# Patient Record
Sex: FEMALE | Race: WHITE | Employment: OTHER | URBAN - METROPOLITAN AREA
[De-identification: names, ages, dates, MRNs, and addresses within clinical notes are randomized per-mention and may not be internally consistent; named-entity substitution may affect disease eponyms.]

---

## 2017-05-01 ENCOUNTER — LAB ENCOUNTER (OUTPATIENT)
Dept: LAB | Facility: HOSPITAL | Age: 82
End: 2017-05-01
Attending: INTERNAL MEDICINE
Payer: MEDICARE

## 2017-05-01 DIAGNOSIS — E11.49 DIABETIC NEUROPATHY WITH NEUROLOGIC COMPLICATION (HCC): ICD-10-CM

## 2017-05-01 DIAGNOSIS — M19.90 SENILE ARTHRITIS: ICD-10-CM

## 2017-05-01 DIAGNOSIS — E66.9 OBESITY, UNSPECIFIED: ICD-10-CM

## 2017-05-01 DIAGNOSIS — E13.39: ICD-10-CM

## 2017-05-01 DIAGNOSIS — E11.40 DIABETIC NEUROPATHY WITH NEUROLOGIC COMPLICATION (HCC): ICD-10-CM

## 2017-05-01 DIAGNOSIS — E11.65 TYPE II DIABETES MELLITUS, UNCONTROLLED (HCC): Primary | ICD-10-CM

## 2017-05-01 DIAGNOSIS — I10 ESSENTIAL HYPERTENSION, MALIGNANT: ICD-10-CM

## 2017-05-01 DIAGNOSIS — E13.65: ICD-10-CM

## 2017-05-01 DIAGNOSIS — E78.2 MIXED HYPERLIPIDEMIA: ICD-10-CM

## 2017-05-01 PROCEDURE — 80053 COMPREHEN METABOLIC PANEL: CPT

## 2017-05-01 PROCEDURE — 83036 HEMOGLOBIN GLYCOSYLATED A1C: CPT

## 2017-05-01 PROCEDURE — 36415 COLL VENOUS BLD VENIPUNCTURE: CPT

## 2017-05-01 PROCEDURE — 85025 COMPLETE CBC W/AUTO DIFF WBC: CPT

## 2017-06-05 ENCOUNTER — PRIOR ORIGINAL RECORDS (OUTPATIENT)
Dept: OTHER | Age: 82
End: 2017-06-05

## 2017-07-17 ENCOUNTER — PRIOR ORIGINAL RECORDS (OUTPATIENT)
Dept: OTHER | Age: 82
End: 2017-07-17

## 2017-07-18 ENCOUNTER — HOSPITAL ENCOUNTER (OUTPATIENT)
Dept: CV DIAGNOSTICS | Facility: HOSPITAL | Age: 82
Discharge: HOME | End: 2017-07-18
Attending: INTERNAL MEDICINE
Payer: MEDICARE

## 2017-07-18 DIAGNOSIS — R06.03 ACUTE RESPIRATORY DISTRESS: ICD-10-CM

## 2017-07-18 DIAGNOSIS — I25.10 DISEASE OF CARDIOVASCULAR SYSTEM: ICD-10-CM

## 2017-07-18 DIAGNOSIS — I48.0 EPISODIC ATRIAL FIBRILLATION (HCC): ICD-10-CM

## 2017-07-18 PROCEDURE — 93017 CV STRESS TEST TRACING ONLY: CPT | Performed by: INTERNAL MEDICINE

## 2017-07-18 PROCEDURE — 78452 HT MUSCLE IMAGE SPECT MULT: CPT | Performed by: INTERNAL MEDICINE

## 2017-07-18 PROCEDURE — 93018 CV STRESS TEST I&R ONLY: CPT | Performed by: INTERNAL MEDICINE

## 2017-07-20 ENCOUNTER — PRIOR ORIGINAL RECORDS (OUTPATIENT)
Dept: OTHER | Age: 82
End: 2017-07-20

## 2017-08-02 ENCOUNTER — PRIOR ORIGINAL RECORDS (OUTPATIENT)
Dept: OTHER | Age: 82
End: 2017-08-02

## 2017-08-02 ENCOUNTER — LAB ENCOUNTER (OUTPATIENT)
Dept: LAB | Facility: HOSPITAL | Age: 82
End: 2017-08-02
Attending: INTERNAL MEDICINE
Payer: MEDICARE

## 2017-08-02 DIAGNOSIS — E11.21 DIABETIC GLOMERULOPATHY (HCC): ICD-10-CM

## 2017-08-02 DIAGNOSIS — E13.39: Primary | ICD-10-CM

## 2017-08-02 DIAGNOSIS — I10 ESSENTIAL HYPERTENSION, MALIGNANT: ICD-10-CM

## 2017-08-02 DIAGNOSIS — M19.90 SENILE ARTHRITIS: ICD-10-CM

## 2017-08-02 DIAGNOSIS — E11.49 TYPE II OR UNSPECIFIED TYPE DIABETES MELLITUS WITH NEUROLOGICAL MANIFESTATIONS, NOT STATED AS UNCONTROLLED(250.60): ICD-10-CM

## 2017-08-02 DIAGNOSIS — E78.2 MIXED HYPERLIPIDEMIA: ICD-10-CM

## 2017-08-02 DIAGNOSIS — E66.01 MORBID OBESITY (HCC): ICD-10-CM

## 2017-08-02 LAB
ALBUMIN SERPL BCP-MCNC: 3.8 G/DL (ref 3.5–4.8)
ALBUMIN/GLOB SERPL: 1.3 {RATIO} (ref 1–2)
ALP SERPL-CCNC: 74 U/L (ref 32–100)
ALT SERPL-CCNC: 14 U/L (ref 14–54)
ANION GAP SERPL CALC-SCNC: 9 MMOL/L (ref 0–18)
AST SERPL-CCNC: 19 U/L (ref 15–41)
BILIRUB SERPL-MCNC: 0.7 MG/DL (ref 0.3–1.2)
BUN SERPL-MCNC: 35 MG/DL (ref 8–20)
BUN/CREAT SERPL: 28.9 (ref 10–20)
CALCIUM SERPL-MCNC: 9 MG/DL (ref 8.5–10.5)
CHLORIDE SERPL-SCNC: 98 MMOL/L (ref 95–110)
CHOLEST SERPL-MCNC: 159 MG/DL (ref 110–200)
CO2 SERPL-SCNC: 30 MMOL/L (ref 22–32)
CREAT SERPL-MCNC: 1.21 MG/DL (ref 0.5–1.5)
GLOBULIN PLAS-MCNC: 2.9 G/DL (ref 2.5–3.7)
GLUCOSE SERPL-MCNC: 172 MG/DL (ref 70–99)
HBA1C MFR BLD: 7.4 % (ref 4–6)
HDLC SERPL-MCNC: 53 MG/DL
LDLC SERPL CALC-MCNC: 53 MG/DL (ref 0–99)
NONHDLC SERPL-MCNC: 106 MG/DL
OSMOLALITY UR CALC.SUM OF ELEC: 296 MOSM/KG (ref 275–295)
POTASSIUM SERPL-SCNC: 4.7 MMOL/L (ref 3.3–5.1)
PROT SERPL-MCNC: 6.7 G/DL (ref 5.9–8.4)
SODIUM SERPL-SCNC: 137 MMOL/L (ref 136–144)
T4 FREE SERPL-MCNC: 0.88 NG/DL (ref 0.58–1.64)
TRIGL SERPL-MCNC: 264 MG/DL (ref 1–149)
TSH SERPL-ACNC: 1.36 UIU/ML (ref 0.45–5.33)

## 2017-08-02 PROCEDURE — 84443 ASSAY THYROID STIM HORMONE: CPT

## 2017-08-02 PROCEDURE — 36415 COLL VENOUS BLD VENIPUNCTURE: CPT

## 2017-08-02 PROCEDURE — 80061 LIPID PANEL: CPT

## 2017-08-02 PROCEDURE — 83036 HEMOGLOBIN GLYCOSYLATED A1C: CPT

## 2017-08-02 PROCEDURE — 84439 ASSAY OF FREE THYROXINE: CPT

## 2017-08-02 PROCEDURE — 80053 COMPREHEN METABOLIC PANEL: CPT

## 2017-09-21 LAB
CHOLESTEROL, TOTAL: 159 MG/DL
HDL CHOLESTEROL: 53 MG/DL
LDL CHOLESTEROL: 53 MG/DL
TRIGLYCERIDES: 264 MG/DL

## 2017-10-30 ENCOUNTER — LAB ENCOUNTER (OUTPATIENT)
Dept: LAB | Facility: HOSPITAL | Age: 82
End: 2017-10-30
Attending: INTERNAL MEDICINE
Payer: MEDICARE

## 2017-10-30 DIAGNOSIS — E13.65: Primary | ICD-10-CM

## 2017-10-30 DIAGNOSIS — I10 HYPERTENSION: ICD-10-CM

## 2017-10-30 DIAGNOSIS — M19.90 ARTHRITIS: ICD-10-CM

## 2017-10-30 DIAGNOSIS — E11.49: ICD-10-CM

## 2017-10-30 DIAGNOSIS — E66.01 OBESITIES, MORBID (HCC): ICD-10-CM

## 2017-10-30 DIAGNOSIS — E78.2 MIXED HYPERLIPIDEMIA: ICD-10-CM

## 2017-10-30 DIAGNOSIS — E11.21 DIABETIC GLOMERULOPATHY (HCC): ICD-10-CM

## 2017-10-30 DIAGNOSIS — E13.39: Primary | ICD-10-CM

## 2017-10-30 PROCEDURE — 83036 HEMOGLOBIN GLYCOSYLATED A1C: CPT

## 2017-10-30 PROCEDURE — 85025 COMPLETE CBC W/AUTO DIFF WBC: CPT

## 2017-10-30 PROCEDURE — 36415 COLL VENOUS BLD VENIPUNCTURE: CPT

## 2017-10-30 PROCEDURE — 84443 ASSAY THYROID STIM HORMONE: CPT

## 2017-10-30 PROCEDURE — 80061 LIPID PANEL: CPT

## 2017-12-08 ENCOUNTER — PRIOR ORIGINAL RECORDS (OUTPATIENT)
Dept: OTHER | Age: 82
End: 2017-12-08

## 2017-12-13 ENCOUNTER — PRIOR ORIGINAL RECORDS (OUTPATIENT)
Dept: OTHER | Age: 82
End: 2017-12-13

## 2017-12-21 ENCOUNTER — PRIOR ORIGINAL RECORDS (OUTPATIENT)
Dept: OTHER | Age: 82
End: 2017-12-21

## 2017-12-28 ENCOUNTER — PRIOR ORIGINAL RECORDS (OUTPATIENT)
Dept: OTHER | Age: 82
End: 2017-12-28

## 2018-01-02 ENCOUNTER — SNF VISIT (OUTPATIENT)
Dept: INTERNAL MEDICINE CLINIC | Facility: SKILLED NURSING FACILITY | Age: 83
End: 2018-01-02

## 2018-01-02 ENCOUNTER — PRIOR ORIGINAL RECORDS (OUTPATIENT)
Dept: OTHER | Age: 83
End: 2018-01-02

## 2018-01-02 DIAGNOSIS — I48.91 ATRIAL FIBRILLATION, UNSPECIFIED TYPE (HCC): ICD-10-CM

## 2018-01-02 DIAGNOSIS — D64.9 ANEMIA, UNSPECIFIED TYPE: ICD-10-CM

## 2018-01-02 DIAGNOSIS — R53.1 WEAKNESS: ICD-10-CM

## 2018-01-02 DIAGNOSIS — K27.9 PUD (PEPTIC ULCER DISEASE): ICD-10-CM

## 2018-01-02 PROCEDURE — 99309 SBSQ NF CARE MODERATE MDM 30: CPT | Performed by: NURSE PRACTITIONER

## 2018-01-02 NOTE — PROGRESS NOTES
HPI: Emanuel Ram  Is an 81 yo female with PMH significant for recent admission for RSV pneumonia discharged 12/15/17, DM type 2, afib, CAD with stents x2, C2 vertebral fx, hx GI bleed 2013, COPD, HTN, hyperlipidemia who was [facility] today, recommending to restart asa if patient is not bleeding.  patient's hgb is trending down, discussed with Dr. Chau Chang- we will hold asa for now due to suspicion of GI bleed, continue xarelto 15mg qd    Patient is at risk for readmission     We

## 2018-01-03 ENCOUNTER — PRIOR ORIGINAL RECORDS (OUTPATIENT)
Dept: OTHER | Age: 83
End: 2018-01-03

## 2018-01-06 ENCOUNTER — HOSPITAL ENCOUNTER (INPATIENT)
Facility: HOSPITAL | Age: 83
LOS: 3 days | Discharge: HOME HEALTH | DRG: 378 | End: 2018-01-09
Attending: EMERGENCY MEDICINE | Admitting: HOSPITALIST
Payer: MEDICARE

## 2018-01-06 DIAGNOSIS — K92.2 GASTROINTESTINAL HEMORRHAGE, UNSPECIFIED GASTROINTESTINAL HEMORRHAGE TYPE: Primary | ICD-10-CM

## 2018-01-06 DIAGNOSIS — D64.9 ANEMIA, UNSPECIFIED TYPE: ICD-10-CM

## 2018-01-06 LAB
ANION GAP SERPL CALC-SCNC: 6 MMOL/L (ref 0–18)
ANTIBODY SCREEN: NEGATIVE
BASOPHILS # BLD: 0 K/UL (ref 0–0.2)
BASOPHILS NFR BLD: 1 %
BUN SERPL-MCNC: 42 MG/DL (ref 8–20)
BUN/CREAT SERPL: 30.4 (ref 10–20)
CALCIUM SERPL-MCNC: 8.5 MG/DL (ref 8.5–10.5)
CHLORIDE SERPL-SCNC: 105 MMOL/L (ref 95–110)
CO2 SERPL-SCNC: 28 MMOL/L (ref 22–32)
CREAT SERPL-MCNC: 1.38 MG/DL (ref 0.5–1.5)
EOSINOPHIL # BLD: 0 K/UL (ref 0–0.7)
EOSINOPHIL NFR BLD: 0 %
ERYTHROCYTE [DISTWIDTH] IN BLOOD BY AUTOMATED COUNT: 20.7 % (ref 11–15)
GLUCOSE BLDC GLUCOMTR-MCNC: 204 MG/DL (ref 70–99)
GLUCOSE BLDC GLUCOMTR-MCNC: 73 MG/DL (ref 70–99)
GLUCOSE BLDC GLUCOMTR-MCNC: 86 MG/DL (ref 70–99)
GLUCOSE SERPL-MCNC: 132 MG/DL (ref 70–99)
HCT VFR BLD AUTO: 20.9 % (ref 35–48)
HCT VFR BLD AUTO: 26 % (ref 35–48)
HGB BLD-MCNC: 6.7 G/DL (ref 12–16)
HGB BLD-MCNC: 8.3 G/DL (ref 12–16)
LYMPHOCYTES # BLD: 1.4 K/UL (ref 1–4)
LYMPHOCYTES NFR BLD: 33 %
MCH RBC QN AUTO: 30.6 PG (ref 27–32)
MCHC RBC AUTO-ENTMCNC: 32.1 G/DL (ref 32–37)
MCV RBC AUTO: 95.4 FL (ref 80–100)
METAMYELOCYTES # BLD MANUAL: 0.17 K/UL
MONOCYTES # BLD: 0.3 K/UL (ref 0–1)
MONOCYTES NFR BLD: 6 %
MRSA DNA SPEC QL NAA+PROBE: NEGATIVE
MYELOCYTES NFR BLD: 4 %
NEUTROPHILS # BLD AUTO: 2.4 K/UL (ref 1.8–7.7)
NEUTROPHILS NFR BLD: 52 %
NEUTS BAND NFR BLD: 4 %
NRBC BLD-RTO: 1 % (ref ?–1)
OSMOLALITY UR CALC.SUM OF ELEC: 300 MOSM/KG (ref 275–295)
PLATELET # BLD AUTO: 221 K/UL (ref 140–400)
PMV BLD AUTO: 7 FL (ref 7.4–10.3)
POTASSIUM SERPL-SCNC: 3.6 MMOL/L (ref 3.3–5.1)
RBC # BLD AUTO: 2.19 M/UL (ref 3.7–5.4)
RH BLOOD TYPE: POSITIVE
SODIUM SERPL-SCNC: 139 MMOL/L (ref 136–144)
WBC # BLD AUTO: 4.2 K/UL (ref 4–11)

## 2018-01-06 PROCEDURE — 86850 RBC ANTIBODY SCREEN: CPT | Performed by: EMERGENCY MEDICINE

## 2018-01-06 PROCEDURE — 86920 COMPATIBILITY TEST SPIN: CPT

## 2018-01-06 PROCEDURE — 99285 EMERGENCY DEPT VISIT HI MDM: CPT

## 2018-01-06 PROCEDURE — 82962 GLUCOSE BLOOD TEST: CPT

## 2018-01-06 PROCEDURE — 85014 HEMATOCRIT: CPT | Performed by: HOSPITALIST

## 2018-01-06 PROCEDURE — 85018 HEMOGLOBIN: CPT | Performed by: HOSPITALIST

## 2018-01-06 PROCEDURE — 30233N1 TRANSFUSION OF NONAUTOLOGOUS RED BLOOD CELLS INTO PERIPHERAL VEIN, PERCUTANEOUS APPROACH: ICD-10-PCS | Performed by: HOSPITALIST

## 2018-01-06 PROCEDURE — 83036 HEMOGLOBIN GLYCOSYLATED A1C: CPT | Performed by: HOSPITALIST

## 2018-01-06 PROCEDURE — 87641 MR-STAPH DNA AMP PROBE: CPT | Performed by: EMERGENCY MEDICINE

## 2018-01-06 PROCEDURE — 85025 COMPLETE CBC W/AUTO DIFF WBC: CPT | Performed by: EMERGENCY MEDICINE

## 2018-01-06 PROCEDURE — 85007 BL SMEAR W/DIFF WBC COUNT: CPT | Performed by: EMERGENCY MEDICINE

## 2018-01-06 PROCEDURE — 82272 OCCULT BLD FECES 1-3 TESTS: CPT

## 2018-01-06 PROCEDURE — 80048 BASIC METABOLIC PNL TOTAL CA: CPT | Performed by: EMERGENCY MEDICINE

## 2018-01-06 PROCEDURE — 86900 BLOOD TYPING SEROLOGIC ABO: CPT | Performed by: EMERGENCY MEDICINE

## 2018-01-06 PROCEDURE — 85027 COMPLETE CBC AUTOMATED: CPT | Performed by: EMERGENCY MEDICINE

## 2018-01-06 PROCEDURE — 36415 COLL VENOUS BLD VENIPUNCTURE: CPT | Performed by: EMERGENCY MEDICINE

## 2018-01-06 PROCEDURE — 86901 BLOOD TYPING SEROLOGIC RH(D): CPT | Performed by: EMERGENCY MEDICINE

## 2018-01-06 RX ORDER — VITS A,C,E/LUTEIN/MINERALS 300MCG-200
1 TABLET ORAL DAILY
Status: DISCONTINUED | OUTPATIENT
Start: 2018-01-06 | End: 2018-01-09

## 2018-01-06 RX ORDER — FUROSEMIDE 40 MG/1
40 TABLET ORAL DAILY
Status: ON HOLD | COMMUNITY
End: 2018-01-09

## 2018-01-06 RX ORDER — INSULIN ASPART 100 [IU]/ML
INJECTION, SOLUTION INTRAVENOUS; SUBCUTANEOUS
COMMUNITY
End: 2018-01-09

## 2018-01-06 RX ORDER — ALLOPURINOL 100 MG/1
100 TABLET ORAL DAILY
Status: DISCONTINUED | OUTPATIENT
Start: 2018-01-06 | End: 2018-01-09

## 2018-01-06 RX ORDER — MAGNESIUM HYDROXIDE/ALUMINUM HYDROXICE/SIMETHICONE 120; 1200; 1200 MG/30ML; MG/30ML; MG/30ML
30 SUSPENSION ORAL 3 TIMES DAILY PRN
Status: DISCONTINUED | OUTPATIENT
Start: 2018-01-06 | End: 2018-01-09

## 2018-01-06 RX ORDER — LISINOPRIL 5 MG/1
5 TABLET ORAL DAILY
Status: ON HOLD | COMMUNITY
End: 2018-01-09

## 2018-01-06 RX ORDER — MELATONIN
325 2 TIMES DAILY WITH MEALS
Status: DISCONTINUED | OUTPATIENT
Start: 2018-01-06 | End: 2018-01-07

## 2018-01-06 RX ORDER — POTASSIUM CHLORIDE 20 MEQ/1
40 TABLET, EXTENDED RELEASE ORAL EVERY 4 HOURS
Status: COMPLETED | OUTPATIENT
Start: 2018-01-06 | End: 2018-01-06

## 2018-01-06 RX ORDER — DOCUSATE SODIUM 100 MG/1
100 CAPSULE, LIQUID FILLED ORAL 2 TIMES DAILY
Status: DISCONTINUED | OUTPATIENT
Start: 2018-01-06 | End: 2018-01-09

## 2018-01-06 RX ORDER — AMIODARONE HYDROCHLORIDE 200 MG/1
200 TABLET ORAL DAILY
Status: ON HOLD | COMMUNITY
End: 2018-01-09

## 2018-01-06 RX ORDER — GABAPENTIN 100 MG/1
100 CAPSULE ORAL 2 TIMES DAILY
Status: ON HOLD | COMMUNITY
End: 2018-01-09

## 2018-01-06 RX ORDER — DEXTROSE MONOHYDRATE 25 G/50ML
50 INJECTION, SOLUTION INTRAVENOUS AS NEEDED
Status: DISCONTINUED | OUTPATIENT
Start: 2018-01-06 | End: 2018-01-09

## 2018-01-06 RX ORDER — MELATONIN
325 2 TIMES DAILY WITH MEALS
Status: ON HOLD | COMMUNITY
End: 2018-01-09

## 2018-01-06 RX ORDER — ALLOPURINOL 100 MG/1
100 TABLET ORAL DAILY
Status: ON HOLD | COMMUNITY
End: 2018-01-09

## 2018-01-06 RX ORDER — ACETAMINOPHEN 325 MG/1
650 TABLET ORAL EVERY 4 HOURS PRN
Status: ON HOLD | COMMUNITY
End: 2018-03-07

## 2018-01-06 RX ORDER — ONDANSETRON 2 MG/ML
4 INJECTION INTRAMUSCULAR; INTRAVENOUS EVERY 6 HOURS PRN
Status: DISCONTINUED | OUTPATIENT
Start: 2018-01-06 | End: 2018-01-09

## 2018-01-06 RX ORDER — GUAIFENESIN 100 MG/5ML
50 SOLUTION ORAL EVERY 4 HOURS PRN
Status: DISCONTINUED | OUTPATIENT
Start: 2018-01-06 | End: 2018-01-09

## 2018-01-06 RX ORDER — INSULIN ASPART 100 [IU]/ML
2 INJECTION, SOLUTION INTRAVENOUS; SUBCUTANEOUS
COMMUNITY
End: 2018-01-09

## 2018-01-06 RX ORDER — AMIODARONE HYDROCHLORIDE 200 MG/1
200 TABLET ORAL DAILY
Status: DISCONTINUED | OUTPATIENT
Start: 2018-01-06 | End: 2018-01-09

## 2018-01-06 RX ORDER — SODIUM CHLORIDE 0.9 % (FLUSH) 0.9 %
3 SYRINGE (ML) INJECTION AS NEEDED
Status: DISCONTINUED | OUTPATIENT
Start: 2018-01-06 | End: 2018-01-09

## 2018-01-06 RX ORDER — AMINO ACIDS/MV,IRON,MIN
1 TABLET ORAL DAILY
COMMUNITY

## 2018-01-06 RX ORDER — SODIUM CHLORIDE 9 MG/ML
INJECTION, SOLUTION INTRAVENOUS
Status: COMPLETED
Start: 2018-01-06 | End: 2018-01-06

## 2018-01-06 RX ORDER — OMEPRAZOLE 20 MG/1
20 CAPSULE, DELAYED RELEASE ORAL
Status: ON HOLD | COMMUNITY
End: 2018-01-09

## 2018-01-06 RX ORDER — ACETAMINOPHEN 325 MG/1
650 TABLET ORAL EVERY 6 HOURS PRN
Status: DISCONTINUED | OUTPATIENT
Start: 2018-01-06 | End: 2018-01-09

## 2018-01-06 RX ORDER — POLYETHYLENE GLYCOL 3350 17 G/17G
17 POWDER, FOR SOLUTION ORAL DAILY
Status: ON HOLD | COMMUNITY
End: 2018-01-09

## 2018-01-06 RX ORDER — DEXTROSE MONOHYDRATE 25 G/50ML
50 INJECTION, SOLUTION INTRAVENOUS AS NEEDED
Status: DISCONTINUED | OUTPATIENT
Start: 2018-01-06 | End: 2018-01-06

## 2018-01-06 RX ORDER — ATORVASTATIN CALCIUM 20 MG/1
20 TABLET, FILM COATED ORAL NIGHTLY
Status: DISCONTINUED | OUTPATIENT
Start: 2018-01-06 | End: 2018-01-09

## 2018-01-06 RX ORDER — THIAMINE HCL 100 MG
2500 TABLET ORAL DAILY
Status: ON HOLD | COMMUNITY
End: 2018-01-09

## 2018-01-06 RX ORDER — GABAPENTIN 100 MG/1
100 CAPSULE ORAL 2 TIMES DAILY
Status: DISCONTINUED | OUTPATIENT
Start: 2018-01-06 | End: 2018-01-09

## 2018-01-06 RX ORDER — MORPHINE SULFATE 2 MG/ML
1 INJECTION, SOLUTION INTRAMUSCULAR; INTRAVENOUS
Status: DISCONTINUED | OUTPATIENT
Start: 2018-01-06 | End: 2018-01-09

## 2018-01-06 RX ORDER — SIMVASTATIN 40 MG
40 TABLET ORAL NIGHTLY
COMMUNITY
End: 2018-01-09

## 2018-01-06 RX ORDER — MAGNESIUM HYDROXIDE/ALUMINUM HYDROXICE/SIMETHICONE 120; 1200; 1200 MG/30ML; MG/30ML; MG/30ML
30 SUSPENSION ORAL 3 TIMES DAILY PRN
Status: ON HOLD | COMMUNITY
End: 2018-03-08

## 2018-01-06 RX ORDER — DOCUSATE SODIUM 100 MG/1
100 CAPSULE, LIQUID FILLED ORAL 2 TIMES DAILY
Status: ON HOLD | COMMUNITY
End: 2018-01-09

## 2018-01-06 RX ORDER — TRAZODONE HYDROCHLORIDE 50 MG/1
25 TABLET ORAL NIGHTLY PRN
Status: DISCONTINUED | OUTPATIENT
Start: 2018-01-06 | End: 2018-01-09

## 2018-01-06 NOTE — CONSULTS
[facility] HOSP - [facility]    Report of Consultation    Puja Zepeda Patient Status:  Emergency    1930 MRN [id number]   Location [address] Attending Trisha Briscoe MD   Hosp Day # 0 PCP None Pcp     Date of Admission admission)    Allergies    Codeine                   Diazepam                    Review of Systems:   GENERAL HEALTH: feels well otherwise, denies fever or weight loss  SKIN: denies any unusual skin lesions or rashes  EYES: no visual complaints or deficits patient and 2 daughters the need for repeat upper endoscopy with endoscopic hemostasis. Given her renal function, and recent use of Xarelto, as long she remains stable we will plan to proceed tomorrow.   Calls are being made to the nursing home to clarify

## 2018-01-06 NOTE — ED PROVIDER NOTES
Patient Seen in: [facility] Emergency Department    History   Patient presents with:  Anemia (hematologic)    Stated Complaint: low hemoglobin    HPI  Patient complains of dk stool and low hgb, that began weeks ago she had a GI bleed while [facility] mg by mouth daily. cholecalciferol 5000 units Oral Cap,  Take 5,000 Units by mouth daily. Alum & Mag Hydroxide-Simeth (SHRUTHI-LANTA) 302-717-00 MG/5ML Oral Suspension,  Take 30 mL by mouth 3 (three) times daily as needed for Indigestion.    insulin glargi (Temporal)   Resp 16   Ht 154.9 cm (5' 1\")   Wt 93.4 kg   SpO2 98%   BMI 38.92 kg/m²   PULSE OX Nl on room air    GENERAL: tired no distress  HEAD: normocephalic, atraumatic  EYES: PERRLA, EOMI,THROAT: mmm, no lesions  NECK: supple, no meningeal signs  ERIKA Abnormality         Status                     ---------                               -----------         ------                     ABORH (BLOOD [facility])[[id number]]                               Final result               ANTIBODY [facility][[id number]] hemorrhage K92.2 1/6/2018 Unknown                Disposition and Plan     Clinical Impression:  Gastrointestinal hemorrhage, unspecified gastrointestinal hemorrhage type  (primary encounter diagnosis)  Anemia, unspecified type    Disposition:  Shira Beltran

## 2018-01-07 ENCOUNTER — SURGERY (OUTPATIENT)
Age: 83
End: 2018-01-07

## 2018-01-07 ENCOUNTER — ANESTHESIA (OUTPATIENT)
Dept: ENDOSCOPY | Facility: HOSPITAL | Age: 83
DRG: 378 | End: 2018-01-07
Payer: MEDICARE

## 2018-01-07 ENCOUNTER — ANESTHESIA EVENT (OUTPATIENT)
Dept: ENDOSCOPY | Facility: HOSPITAL | Age: 83
DRG: 378 | End: 2018-01-07
Payer: MEDICARE

## 2018-01-07 LAB
ANION GAP SERPL CALC-SCNC: 6 MMOL/L (ref 0–18)
BASOPHILS # BLD: 0 K/UL (ref 0–0.2)
BASOPHILS NFR BLD: 1 %
BUN SERPL-MCNC: 27 MG/DL (ref 8–20)
BUN/CREAT SERPL: 23.5 (ref 10–20)
CALCIUM SERPL-MCNC: 8.5 MG/DL (ref 8.5–10.5)
CHLORIDE SERPL-SCNC: 109 MMOL/L (ref 95–110)
CO2 SERPL-SCNC: 24 MMOL/L (ref 22–32)
CREAT SERPL-MCNC: 1.15 MG/DL (ref 0.5–1.5)
EOSINOPHIL # BLD: 0 K/UL (ref 0–0.7)
EOSINOPHIL NFR BLD: 0 %
ERYTHROCYTE [DISTWIDTH] IN BLOOD BY AUTOMATED COUNT: 20.1 % (ref 11–15)
GLUCOSE BLDC GLUCOMTR-MCNC: 118 MG/DL (ref 70–99)
GLUCOSE BLDC GLUCOMTR-MCNC: 129 MG/DL (ref 70–99)
GLUCOSE BLDC GLUCOMTR-MCNC: 144 MG/DL (ref 70–99)
GLUCOSE BLDC GLUCOMTR-MCNC: 149 MG/DL (ref 70–99)
GLUCOSE SERPL-MCNC: 141 MG/DL (ref 70–99)
HBA1C MFR BLD: 5.1 % (ref 4–6)
HCT VFR BLD AUTO: 25.3 % (ref 35–48)
HCT VFR BLD AUTO: 25.3 % (ref 35–48)
HGB BLD-MCNC: 8.2 G/DL (ref 12–16)
HGB BLD-MCNC: 8.3 G/DL (ref 12–16)
LYMPHOCYTES # BLD: 1.5 K/UL (ref 1–4)
LYMPHOCYTES NFR BLD: 33 %
MCH RBC QN AUTO: 30.4 PG (ref 27–32)
MCHC RBC AUTO-ENTMCNC: 32.6 G/DL (ref 32–37)
MCV RBC AUTO: 93.2 FL (ref 80–100)
MONOCYTES # BLD: 0.6 K/UL (ref 0–1)
MONOCYTES NFR BLD: 13 %
NEUTROPHILS # BLD AUTO: 2.4 K/UL (ref 1.8–7.7)
NEUTROPHILS NFR BLD: 53 %
OSMOLALITY UR CALC.SUM OF ELEC: 295 MOSM/KG (ref 275–295)
PLATELET # BLD AUTO: 217 K/UL (ref 140–400)
PMV BLD AUTO: 7 FL (ref 7.4–10.3)
POTASSIUM SERPL-SCNC: 4.9 MMOL/L (ref 3.3–5.1)
RBC # BLD AUTO: 2.72 M/UL (ref 3.7–5.4)
SODIUM SERPL-SCNC: 139 MMOL/L (ref 136–144)
WBC # BLD AUTO: 4.5 K/UL (ref 4–11)

## 2018-01-07 PROCEDURE — 97165 OT EVAL LOW COMPLEX 30 MIN: CPT

## 2018-01-07 PROCEDURE — 0DJ08ZZ INSPECTION OF UPPER INTESTINAL TRACT, VIA NATURAL OR ARTIFICIAL OPENING ENDOSCOPIC: ICD-10-PCS | Performed by: INTERNAL MEDICINE

## 2018-01-07 PROCEDURE — 85007 BL SMEAR W/DIFF WBC COUNT: CPT | Performed by: HOSPITALIST

## 2018-01-07 PROCEDURE — 80048 BASIC METABOLIC PNL TOTAL CA: CPT | Performed by: HOSPITALIST

## 2018-01-07 PROCEDURE — C9113 INJ PANTOPRAZOLE SODIUM, VIA: HCPCS | Performed by: INTERNAL MEDICINE

## 2018-01-07 PROCEDURE — 97530 THERAPEUTIC ACTIVITIES: CPT

## 2018-01-07 PROCEDURE — 85018 HEMOGLOBIN: CPT | Performed by: HOSPITALIST

## 2018-01-07 PROCEDURE — 82962 GLUCOSE BLOOD TEST: CPT

## 2018-01-07 PROCEDURE — 0D568ZZ DESTRUCTION OF STOMACH, VIA NATURAL OR ARTIFICIAL OPENING ENDOSCOPIC: ICD-10-PCS | Performed by: INTERNAL MEDICINE

## 2018-01-07 PROCEDURE — 85014 HEMATOCRIT: CPT | Performed by: HOSPITALIST

## 2018-01-07 PROCEDURE — 85025 COMPLETE CBC W/AUTO DIFF WBC: CPT | Performed by: HOSPITALIST

## 2018-01-07 PROCEDURE — 97162 PT EVAL MOD COMPLEX 30 MIN: CPT

## 2018-01-07 PROCEDURE — 85027 COMPLETE CBC AUTOMATED: CPT | Performed by: HOSPITALIST

## 2018-01-07 RX ORDER — SODIUM CHLORIDE, SODIUM LACTATE, POTASSIUM CHLORIDE, CALCIUM CHLORIDE 600; 310; 30; 20 MG/100ML; MG/100ML; MG/100ML; MG/100ML
INJECTION, SOLUTION INTRAVENOUS CONTINUOUS
Status: DISCONTINUED | OUTPATIENT
Start: 2018-01-07 | End: 2018-01-09

## 2018-01-07 RX ORDER — NALOXONE HYDROCHLORIDE 0.4 MG/ML
80 INJECTION, SOLUTION INTRAMUSCULAR; INTRAVENOUS; SUBCUTANEOUS AS NEEDED
Status: DISCONTINUED | OUTPATIENT
Start: 2018-01-07 | End: 2018-01-07 | Stop reason: HOSPADM

## 2018-01-07 RX ORDER — LIDOCAINE HYDROCHLORIDE 10 MG/ML
INJECTION, SOLUTION EPIDURAL; INFILTRATION; INTRACAUDAL; PERINEURAL AS NEEDED
Status: DISCONTINUED | OUTPATIENT
Start: 2018-01-07 | End: 2018-01-07 | Stop reason: SURG

## 2018-01-07 RX ORDER — SODIUM CHLORIDE, SODIUM LACTATE, POTASSIUM CHLORIDE, CALCIUM CHLORIDE 600; 310; 30; 20 MG/100ML; MG/100ML; MG/100ML; MG/100ML
INJECTION, SOLUTION INTRAVENOUS CONTINUOUS PRN
Status: DISCONTINUED | OUTPATIENT
Start: 2018-01-07 | End: 2018-01-07 | Stop reason: SURG

## 2018-01-07 RX ORDER — ONDANSETRON 2 MG/ML
INJECTION INTRAMUSCULAR; INTRAVENOUS AS NEEDED
Status: DISCONTINUED | OUTPATIENT
Start: 2018-01-07 | End: 2018-01-07 | Stop reason: SURG

## 2018-01-07 RX ORDER — PHENYLEPHRINE HCL 10 MG/ML
VIAL (ML) INJECTION AS NEEDED
Status: DISCONTINUED | OUTPATIENT
Start: 2018-01-07 | End: 2018-01-07 | Stop reason: SURG

## 2018-01-07 RX ADMIN — PHENYLEPHRINE HCL 100 MCG: 10 MG/ML VIAL (ML) INJECTION at 07:49:00

## 2018-01-07 RX ADMIN — ONDANSETRON 4 MG: 2 INJECTION INTRAMUSCULAR; INTRAVENOUS at 07:48:00

## 2018-01-07 RX ADMIN — SODIUM CHLORIDE, SODIUM LACTATE, POTASSIUM CHLORIDE, CALCIUM CHLORIDE: 600; 310; 30; 20 INJECTION, SOLUTION INTRAVENOUS at 07:42:00

## 2018-01-07 RX ADMIN — SODIUM CHLORIDE, SODIUM LACTATE, POTASSIUM CHLORIDE, CALCIUM CHLORIDE: 600; 310; 30; 20 INJECTION, SOLUTION INTRAVENOUS at 07:59:00

## 2018-01-07 RX ADMIN — LIDOCAINE HYDROCHLORIDE 50 MG: 10 INJECTION, SOLUTION EPIDURAL; INFILTRATION; INTRACAUDAL; PERINEURAL at 07:44:00

## 2018-01-07 NOTE — ANESTHESIA PREPROCEDURE EVALUATION
Anesthesia PreOp Note    HPI:     Nish Sierra is a 80year old female who presents for preoperative consultation requested by: Dorlene Gilford, MD    Date of Surgery: 1/6/2018 - 1/7/2018    Procedure(s):  ESOPHAGOGASTRODUODENOSCOPY (EGD)  Indication: me cholecalciferol 5000 units Oral Cap Take 5,000 Units by mouth daily. Disp:  Rfl:  1/6/2018 at Unknown time   insulin glargine 100 UNIT/ML Subcutaneous Solution Pen-injector Inject 14 Units into the skin every morning.  Disp:  Rfl:  1/6/2018 at Unknown time ondansetron HCl (ZOFRAN) injection 4 mg 4 mg Intravenous Q6H PRN Araseli Reyes,     acetaminophen (TYLENOL) tab 650 mg 650 mg Oral Q6H PRN Araseli Reyes,     morphINE sulfate (PF) 2 MG/ML injection 1 mg 1 mg Intravenous Q3H PRN Araseli Reyes,     Pantopraz Marital status:   Spouse name: N/A    Years of education: N/A  Number of children: N/A     Occupational History  None on file     Social History Main Topics   Smoking status: Former Smoker     Smokeless tobacco: Never Used    Alcohol use No    Drug (+) hypertension, CAD, CABG/stent, dysrhythmias (Afib on Xarelto (now stopped)),     Neuro/Psych    (+) TIA (10 years ago),     GI/Hepatic/Renal    (-) GERD    Endo/Other    (+) diabetes mellitus, blood dyscrasia (Anemia),   Abdominal   (+) obese,

## 2018-01-07 NOTE — OPERATIVE REPORT
ESOPHAGOGASTRODUODENOSCOPY REPORT    Patient Name:  JOSEPH Zepeda Record #: [id number]  YOB: 1930  Date of Procedure: 1/7/2018    Referring physician: None Pcp    Surgeon:  Tad Abarca.  Kaur Balbuena MD    Pre-op diagnosis: Melena with rec

## 2018-01-07 NOTE — SPIRITUAL CARE NOTE
Pt. Expressed that she is concerned/scared about the night and how it will go as she will have endoscopy tomorrow. Prayed with patient and family as requested for a pleasant night.

## 2018-01-07 NOTE — ANESTHESIA POSTPROCEDURE EVALUATION
Patient: Ben Pond Loss    Procedure Summary     Date:  01/07/18 Room / Location:  [address] [place] [address] ENDOSCOPY 01 / [address] [place] [address] ENDOSCOPY    Anesthesia Start:  9907 Anesthesia Stop:  9003    Procedure:  ESOPHAGOGASTRODUODENOSCOPY (EGD) (N/A ) Diagnosis:  (AVM STOMACH)    Surgeon

## 2018-01-07 NOTE — H&P (VIEW-ONLY)
[facility] HOSP - [facility]    Report of Consultation    Belinda Zepeda Patient Status:  Emergency    1930 MRN [id number]   Location [address] Attending Inés Palomo MD   Hosp Day # 0 PCP None Pcp     Date of Admission admission)    Allergies    Codeine                   Diazepam                    Review of Systems:   GENERAL HEALTH: feels well otherwise, denies fever or weight loss  SKIN: denies any unusual skin lesions or rashes  EYES: no visual complaints or deficits patient and 2 daughters the need for repeat upper endoscopy with endoscopic hemostasis. Given her renal function, and recent use of Xarelto, as long she remains stable we will plan to proceed tomorrow.   Calls are being made to the nursing home to clarify

## 2018-01-07 NOTE — INTERVAL H&P NOTE
Pre-op Diagnosis: melena    The above referenced H&P was reviewed by Nevin Alcantara MD on 1/7/2018, the patient was examined and no significant changes have occurred in the patient's condition since the H&P was performed.   I discussed with the patient an

## 2018-01-07 NOTE — PHYSICAL THERAPY NOTE
PHYSICAL THERAPY EVALUATION - INPATIENT     Room Number: 321/321-A  Evaluation Date: 1/7/2018  Type of Evaluation: Initial  Physician Order: PT Eval and Treat    Presenting Problem: GI hemorrhage  Reason for Therapy: Mobility Dysfunction and Discharge nausea and vomiting)        Past Surgical History  Past Surgical History:  01/2018: EGD    HOME SITUATION  Type of Home: House   Home Layout: One level  Stairs to Enter : 2  Railing: Yes  Stairs to Bedroom: 0       Lives With: Spouse     Patient Owned Manjinder Limits  Stoop/Curb Assistance: Not tested       Bed Mobility:Pt up in the chair. Transfers:Pt demo with rw, mod indep. Exercise/Education Provided:   Body mechanics  Gait training  Transfer training    Patient End of Session: Up in chair;Needs met;C

## 2018-01-07 NOTE — H&P
[facility] Hospitalist H&P       CC: Patient presents with:  Anemia (hematologic)       PCP: None Pcp    ASSESSMENT / PLAN:   Patient is a 80year old female with PMH sig for a-fib on xarelto, CAD s/p stent x2, HLD, TIA,CVA, diverticulitis,COPD, DM2, HTN, GI blee stools 2-3x/day for the past few days after taking milk of mag. Her hgb was checked at found to be less than 7. She also admits to mild dyspnea as well as fatigue.  No chest pain, abdominal pain, nausea, vomiting, hematemesis, fever/chills or other complain (two) times daily before meals. Disp:  Rfl:    simvastatin 40 MG Oral Tab Take 40 mg by mouth nightly. Disp:  Rfl:    docusate sodium 100 MG Oral Cap Take 100 mg by mouth 2 (two) times daily.  Disp:  Rfl:          Soc Hx     Smoking status: Former Smoker Recent Labs   Lab  01/06/18   1233  01/07/18   0553   GLU  132*  141*   BUN  42*  27*   CREATSERUM  1.38  1.15   GFRAA  44*  54*   GFRNAA  36*  45*   CA  8.5  8.5   NA  139  139   K  3.6  4.9   CL  105  109   CO2  28  24       Lab Results  Componen

## 2018-01-07 NOTE — PLAN OF CARE
Diabetes/Glucose Control    • Glucose maintained within prescribed range Progressing        HEMATOLOGIC - ADULT    • Maintains hematologic stability Progressing    • Free from bleeding injury Progressing        Patient/Family Goals    • Patient/Family Long

## 2018-01-07 NOTE — CM/SW NOTE
Pt was admitted from [facility]. SW requested clinical updates to be sent over. Per PT notes, pt's progress is pending at this time.      Imelda Liu, [address]

## 2018-01-07 NOTE — PROGRESS NOTES
[facility] Hospitalist Progress Note     CC: Hospital Follow up    PCP: None Pcp       Assessment/Plan:   Patient is a 80year old female with PMH sig for a-fib on xarelto, CAD s/p stent x2, HLD, TIA,CVA, diverticulitis,COPD, DM2, HTN, GI bleed in 2013 and most r temperature 98.2 °F (36.8 °C), temperature source Oral, resp. rate 16, height 154.9 cm (5' 1\"), weight 208 lb 9.6 oz (94.6 kg), SpO2 96 %.     Temp:  [97.3 °F (36.3 °C)-98.6 °F (37 °C)] 98.2 °F (36.8 °C)  Pulse:  [65-84] 68  Resp:  [14-20] 16  BP: (806-142 atorvastatin  20 mg Oral Nightly   • OCUVITE-LUTEIN  1 tablet Oral Daily   • docusate sodium  100 mg Oral BID   • gabapentin  100 mg Oral BID   • Insulin Aspart Pen  1-5 Units Subcutaneous TID CC     • lactated ringers       Normal Saline Flush, ondansetro

## 2018-01-07 NOTE — OCCUPATIONAL THERAPY NOTE
OCCUPATIONAL THERAPY EVALUATION - INPATIENT     Room Number: 321/321-A  Evaluation Date: 1/7/2018  Type of Evaluation: Initial  Presenting Problem:  (gi bleed)    Physician Order: IP Consult to Occupational Therapy  Reason for Therapy: ADL/IADL Dysfunction Atherosclerosis of coronary artery    • Constipation    • COPD (chronic obstructive pulmonary disease) (HCC)    • Coronary atherosclerosis    • Diabetes ([facility]    • Essential hypertension    • High blood pressure    • PONV (postoperative nausea and vomiting Modifier (G-Code): CJ    FUNCTIONAL TRANSFER ASSESSMENT     Sit to Stand: Modified independent  Chair Transfer: mod i     BALANCE ASSESSMENT  Static Sitting: independent  Dynamic Sitting: independent  Static Standing: supervision  Dynamic Standing: supervi

## 2018-01-07 NOTE — PLAN OF CARE
Problem: Diabetes/Glucose Control  Goal: Glucose maintained within prescribed range  INTERVENTIONS:  - Monitor Blood Glucose as ordered  - Assess for signs and symptoms of hyperglycemia and hypoglycemia  - Administer ordered medications to maintain glucose fluids and medications as ordered and appropriate  - Administer supportive blood products/factors as ordered and appropriate   Outcome: Progressing    Goal: Free from bleeding injury  (Example usage: patient with low platelets)  INTERVENTIONS:  - Avoid int

## 2018-01-08 ENCOUNTER — SURGERY (OUTPATIENT)
Age: 83
End: 2018-01-08

## 2018-01-08 ENCOUNTER — PRIOR ORIGINAL RECORDS (OUTPATIENT)
Dept: OTHER | Age: 83
End: 2018-01-08

## 2018-01-08 ENCOUNTER — ANESTHESIA EVENT (OUTPATIENT)
Dept: ENDOSCOPY | Facility: HOSPITAL | Age: 83
DRG: 378 | End: 2018-01-08
Payer: MEDICARE

## 2018-01-08 ENCOUNTER — ANESTHESIA (OUTPATIENT)
Dept: ENDOSCOPY | Facility: HOSPITAL | Age: 83
DRG: 378 | End: 2018-01-08
Payer: MEDICARE

## 2018-01-08 LAB
ANION GAP SERPL CALC-SCNC: 9 MMOL/L (ref 0–18)
BASOPHILS # BLD: 0.1 K/UL (ref 0–0.2)
BASOPHILS NFR BLD: 1 %
BUN SERPL-MCNC: 20 MG/DL (ref 8–20)
BUN/CREAT SERPL: 18.2 (ref 10–20)
CALCIUM SERPL-MCNC: 8.5 MG/DL (ref 8.5–10.5)
CHLORIDE SERPL-SCNC: 106 MMOL/L (ref 95–110)
CO2 SERPL-SCNC: 24 MMOL/L (ref 22–32)
CREAT SERPL-MCNC: 1.1 MG/DL (ref 0.5–1.5)
EOSINOPHIL # BLD: 0 K/UL (ref 0–0.7)
EOSINOPHIL NFR BLD: 1 %
ERYTHROCYTE [DISTWIDTH] IN BLOOD BY AUTOMATED COUNT: 19.9 % (ref 11–15)
GLUCOSE BLDC GLUCOMTR-MCNC: 107 MG/DL (ref 70–99)
GLUCOSE BLDC GLUCOMTR-MCNC: 131 MG/DL (ref 70–99)
GLUCOSE BLDC GLUCOMTR-MCNC: 138 MG/DL (ref 70–99)
GLUCOSE BLDC GLUCOMTR-MCNC: 208 MG/DL (ref 70–99)
GLUCOSE SERPL-MCNC: 127 MG/DL (ref 70–99)
HCT VFR BLD AUTO: 26.2 % (ref 35–48)
HGB BLD-MCNC: 8.6 G/DL (ref 12–16)
LYMPHOCYTES # BLD: 1.4 K/UL (ref 1–4)
LYMPHOCYTES NFR BLD: 34 %
MCH RBC QN AUTO: 30.5 PG (ref 27–32)
MCHC RBC AUTO-ENTMCNC: 32.9 G/DL (ref 32–37)
MCV RBC AUTO: 92.7 FL (ref 80–100)
MONOCYTES # BLD: 0.5 K/UL (ref 0–1)
MONOCYTES NFR BLD: 14 %
NEUTROPHILS # BLD AUTO: 2 K/UL (ref 1.8–7.7)
NEUTROPHILS NFR BLD: 50 %
OSMOLALITY UR CALC.SUM OF ELEC: 292 MOSM/KG (ref 275–295)
PLATELET # BLD AUTO: 176 K/UL (ref 140–400)
PMV BLD AUTO: 8.1 FL (ref 7.4–10.3)
POTASSIUM SERPL-SCNC: 4.7 MMOL/L (ref 3.3–5.1)
RBC # BLD AUTO: 2.83 M/UL (ref 3.7–5.4)
SODIUM SERPL-SCNC: 139 MMOL/L (ref 136–144)
WBC # BLD AUTO: 4 K/UL (ref 4–11)

## 2018-01-08 PROCEDURE — 0DJD8ZZ INSPECTION OF LOWER INTESTINAL TRACT, VIA NATURAL OR ARTIFICIAL OPENING ENDOSCOPIC: ICD-10-PCS | Performed by: INTERNAL MEDICINE

## 2018-01-08 PROCEDURE — 88305 TISSUE EXAM BY PATHOLOGIST: CPT | Performed by: INTERNAL MEDICINE

## 2018-01-08 PROCEDURE — 0DBP8ZZ EXCISION OF RECTUM, VIA NATURAL OR ARTIFICIAL OPENING ENDOSCOPIC: ICD-10-PCS | Performed by: INTERNAL MEDICINE

## 2018-01-08 PROCEDURE — 82962 GLUCOSE BLOOD TEST: CPT

## 2018-01-08 PROCEDURE — 85025 COMPLETE CBC W/AUTO DIFF WBC: CPT | Performed by: HOSPITALIST

## 2018-01-08 PROCEDURE — 80048 BASIC METABOLIC PNL TOTAL CA: CPT | Performed by: HOSPITALIST

## 2018-01-08 PROCEDURE — C9113 INJ PANTOPRAZOLE SODIUM, VIA: HCPCS | Performed by: INTERNAL MEDICINE

## 2018-01-08 RX ORDER — DEXTROSE AND SODIUM CHLORIDE 5; .45 G/100ML; G/100ML
INJECTION, SOLUTION INTRAVENOUS CONTINUOUS PRN
Status: DISCONTINUED | OUTPATIENT
Start: 2018-01-08 | End: 2018-01-08 | Stop reason: SURG

## 2018-01-08 RX ORDER — NALOXONE HYDROCHLORIDE 0.4 MG/ML
80 INJECTION, SOLUTION INTRAMUSCULAR; INTRAVENOUS; SUBCUTANEOUS AS NEEDED
Status: DISCONTINUED | OUTPATIENT
Start: 2018-01-08 | End: 2018-01-08 | Stop reason: HOSPADM

## 2018-01-08 RX ORDER — SODIUM CHLORIDE, SODIUM LACTATE, POTASSIUM CHLORIDE, CALCIUM CHLORIDE 600; 310; 30; 20 MG/100ML; MG/100ML; MG/100ML; MG/100ML
INJECTION, SOLUTION INTRAVENOUS CONTINUOUS
Status: DISCONTINUED | OUTPATIENT
Start: 2018-01-08 | End: 2018-01-09

## 2018-01-08 RX ADMIN — DEXTROSE AND SODIUM CHLORIDE: 5; .45 INJECTION, SOLUTION INTRAVENOUS at 15:32:00

## 2018-01-08 RX ADMIN — DEXTROSE AND SODIUM CHLORIDE: 5; .45 INJECTION, SOLUTION INTRAVENOUS at 15:12:00

## 2018-01-08 NOTE — OPERATIVE REPORT
[facility] HOSP - [facility]    Colonoscopy Report      Jimena Lam Loss Patient Status:  Inpatient    1930 MRN [id number]   Location [facility] ENDOSCOPY LAB SUITES Attending Bailee Feliz DO       DATE OF OPERATION: 2018     REFERRING PHYSIC

## 2018-01-08 NOTE — H&P
[facility] Patient Status:  Inpatient    1930 MRN [id number]   Location [facility] ENDOSCOPY LAB SUITES Attending Yves Montana, [address] [facility] Day # 2 PCP None Pcp     Date:  2018  Date of lisinopril 5 MG Oral Tab Take 5 mg by mouth daily. omeprazole 20 MG Oral Capsule Delayed Release Take 20 mg by mouth 2 (two) times daily before meals. simvastatin 40 MG Oral Tab Take 40 mg by mouth nightly.    docusate sodium 100 MG Oral Cap Take 100 AST 19 08/02/2017   ALT 14 08/02/2017   T4F 0.88 08/02/2017   TSH 1.29 10/30/2017       Assessment/Plan:   Gastrointestinal hemorrhage, unspecified gastrointestinal hemorrhage type  Acute GI blood loss anemia    Plan: Colonoscopy        Huseyin Souza MD

## 2018-01-08 NOTE — PHYSICAL THERAPY NOTE
Chart reviewed  Noted pt at coloscopy and not available for PM therapy attempt      PT is following and will follow up later today or tomorrow as appropriate

## 2018-01-08 NOTE — ANESTHESIA PREPROCEDURE EVALUATION
Anesthesia PreOp Note    HPI:     Vika Castanon is a 80year old female who presents for preoperative consultation requested by: Jyothi Friedman MD    Date of Surgery: 1/6/2018 - 1/8/2018    Procedure(s):  COLONOSCOPY  Indication: Anemia      History Revie 5,000 Units by mouth daily. Disp:  Rfl:  1/6/2018 at Unknown time   insulin glargine 100 UNIT/ML Subcutaneous Solution Pen-injector Inject 14 Units into the skin every morning.  Disp:  Rfl:  1/6/2018 at Unknown time   lisinopril 5 MG Oral Tab Take 5 mg by m Araseli Reyes DO    ondansetron HCl (ZOFRAN) injection 4 mg 4 mg Intravenous Q6H PRN Araseli Reyes DO    acetaminophen (TYLENOL) tab 650 mg 650 mg Oral Q6H PRN Araseli Reyes  mg at 01/08/18 0357   morphINE sulfate (PF) 2 MG/ML injection 1 mg 1 mg Allyssa Mcrae Social History  Social History   Marital status:   Spouse name: N/A    Years of education: N/A  Number of children: N/A     Occupational History  None on file     Social History Main Topics   Smoking status: Former Smoker     Smokeless tobacco - normal exam             Anesthesia Plan:   ASA:  3  Plan:   MAC  Post-op Pain Management: IV analgesics  Informed Consent Plan and Risks Discussed With:  Patient  Discussed plan with:  CRNA      I have informed [facility] Loss  of the nature of the anesthe

## 2018-01-08 NOTE — ANESTHESIA POSTPROCEDURE EVALUATION
Patient: Meagan Zepeda    Procedure Summary     Date:  01/08/18 Room / Location:  [address] [place] [address] ENDOSCOPY 01 / [address] [place] [address] ENDOSCOPY    Anesthesia Start:  2832 Anesthesia Stop:  4985    Procedure:  COLONOSCOPY (N/A ) Diagnosis:  (diverticulosis; polyp; hemorrhoids)    Surge

## 2018-01-08 NOTE — PROGRESS NOTES
[facility] Hospitalist Progress Note     CC: Hospital Follow up    PCP: None Pcp       Assessment/Plan:   Patient is a 80year old female with PMH sig for a-fib on xarelto, CAD s/p stent x2, HLD, TIA,CVA, diverticulitis,COPD, DM2, HTN, GI bleed in 2013 and most r height 154.9 cm (5' 1\"), weight 208 lb 9.6 oz (94.6 kg), SpO2 97 %.     Temp:  [97.3 °F (36.3 °C)-97.8 °F (36.6 °C)] 97.8 °F (36.6 °C)  Pulse:  [63-92] 66  Resp:  [12-20] 18  BP: (119-157)/(44-86) 119/46      Intake/Output:    Intake/Output Summary (Last 2 pantoprazole (PROTONIX) IV push  40 mg Intravenous Q24H   • allopurinol  100 mg Oral Daily   • amiodarone HCl  200 mg Oral Daily   • atorvastatin  20 mg Oral Nightly   • OCUVITE-LUTEIN  1 tablet Oral Daily   • docusate sodium  100 mg Oral BID   • gabapenti

## 2018-01-09 ENCOUNTER — PRIOR ORIGINAL RECORDS (OUTPATIENT)
Dept: OTHER | Age: 83
End: 2018-01-09

## 2018-01-09 VITALS
WEIGHT: 207.31 LBS | BODY MASS INDEX: 39.14 KG/M2 | OXYGEN SATURATION: 96 % | HEIGHT: 61 IN | SYSTOLIC BLOOD PRESSURE: 118 MMHG | TEMPERATURE: 98 F | RESPIRATION RATE: 20 BRPM | HEART RATE: 76 BPM | DIASTOLIC BLOOD PRESSURE: 43 MMHG

## 2018-01-09 LAB
ANION GAP SERPL CALC-SCNC: 5 MMOL/L (ref 0–18)
BASOPHILS # BLD: 0 K/UL (ref 0–0.2)
BASOPHILS NFR BLD: 1 %
BUN SERPL-MCNC: 16 MG/DL (ref 8–20)
BUN/CREAT SERPL: 16.3 (ref 10–20)
CALCIUM SERPL-MCNC: 8.7 MG/DL (ref 8.5–10.5)
CHLORIDE SERPL-SCNC: 106 MMOL/L (ref 95–110)
CO2 SERPL-SCNC: 25 MMOL/L (ref 22–32)
CREAT SERPL-MCNC: 0.98 MG/DL (ref 0.5–1.5)
EOSINOPHIL # BLD: 0 K/UL (ref 0–0.7)
EOSINOPHIL NFR BLD: 1 %
ERYTHROCYTE [DISTWIDTH] IN BLOOD BY AUTOMATED COUNT: 19.2 % (ref 11–15)
GLUCOSE BLDC GLUCOMTR-MCNC: 158 MG/DL (ref 70–99)
GLUCOSE BLDC GLUCOMTR-MCNC: 177 MG/DL (ref 70–99)
GLUCOSE SERPL-MCNC: 149 MG/DL (ref 70–99)
HCT VFR BLD AUTO: 25.7 % (ref 35–48)
HGB BLD-MCNC: 8.4 G/DL (ref 12–16)
LYMPHOCYTES # BLD: 1.3 K/UL (ref 1–4)
LYMPHOCYTES NFR BLD: 35 %
MCH RBC QN AUTO: 30.4 PG (ref 27–32)
MCHC RBC AUTO-ENTMCNC: 32.7 G/DL (ref 32–37)
MCV RBC AUTO: 92.9 FL (ref 80–100)
MONOCYTES # BLD: 0.5 K/UL (ref 0–1)
MONOCYTES NFR BLD: 12 %
NEUTROPHILS # BLD AUTO: 2 K/UL (ref 1.8–7.7)
NEUTROPHILS NFR BLD: 52 %
OSMOLALITY UR CALC.SUM OF ELEC: 286 MOSM/KG (ref 275–295)
PLATELET # BLD AUTO: 209 K/UL (ref 140–400)
PMV BLD AUTO: 7.3 FL (ref 7.4–10.3)
POTASSIUM SERPL-SCNC: 4.3 MMOL/L (ref 3.3–5.1)
RBC # BLD AUTO: 2.76 M/UL (ref 3.7–5.4)
SODIUM SERPL-SCNC: 136 MMOL/L (ref 136–144)
WBC # BLD AUTO: 3.8 K/UL (ref 4–11)

## 2018-01-09 PROCEDURE — 82962 GLUCOSE BLOOD TEST: CPT

## 2018-01-09 PROCEDURE — 85025 COMPLETE CBC W/AUTO DIFF WBC: CPT | Performed by: HOSPITALIST

## 2018-01-09 PROCEDURE — 97116 GAIT TRAINING THERAPY: CPT

## 2018-01-09 PROCEDURE — 97110 THERAPEUTIC EXERCISES: CPT

## 2018-01-09 PROCEDURE — 80048 BASIC METABOLIC PNL TOTAL CA: CPT | Performed by: HOSPITALIST

## 2018-01-09 RX ORDER — POLYETHYLENE GLYCOL 3350 17 G/17G
17 POWDER, FOR SOLUTION ORAL DAILY PRN
Qty: 30 EACH | Refills: 1 | Status: SHIPPED | OUTPATIENT
Start: 2018-01-09

## 2018-01-09 RX ORDER — THIAMINE HCL 100 MG
2500 TABLET ORAL DAILY
Qty: 30 TABLET | Refills: 1 | Status: SHIPPED | OUTPATIENT
Start: 2018-01-09

## 2018-01-09 RX ORDER — ATORVASTATIN CALCIUM 20 MG/1
20 TABLET, FILM COATED ORAL NIGHTLY
Qty: 30 TABLET | Refills: 1 | Status: SHIPPED | OUTPATIENT
Start: 2018-01-09

## 2018-01-09 RX ORDER — GABAPENTIN 100 MG/1
100 CAPSULE ORAL 2 TIMES DAILY
Qty: 60 CAPSULE | Refills: 1 | Status: SHIPPED | OUTPATIENT
Start: 2018-01-09 | End: 2018-09-17

## 2018-01-09 RX ORDER — AMIODARONE HYDROCHLORIDE 200 MG/1
200 TABLET ORAL DAILY
Qty: 30 TABLET | Refills: 1 | Status: SHIPPED | OUTPATIENT
Start: 2018-01-09

## 2018-01-09 RX ORDER — OMEPRAZOLE 20 MG/1
20 CAPSULE, DELAYED RELEASE ORAL
Qty: 60 CAPSULE | Refills: 1 | Status: ON HOLD | OUTPATIENT
Start: 2018-01-09 | End: 2019-08-10

## 2018-01-09 RX ORDER — INSULIN LISPRO 100 [IU]/ML
INJECTION, SOLUTION INTRAVENOUS; SUBCUTANEOUS
Qty: 10 ML | Refills: 0 | Status: SHIPPED | OUTPATIENT
Start: 2018-01-09 | End: 2018-01-09

## 2018-01-09 RX ORDER — FUROSEMIDE 40 MG/1
40 TABLET ORAL DAILY
Qty: 30 TABLET | Refills: 1 | Status: SHIPPED | OUTPATIENT
Start: 2018-01-09

## 2018-01-09 RX ORDER — INSULIN LISPRO 100 [IU]/ML
INJECTION, SOLUTION INTRAVENOUS; SUBCUTANEOUS
Qty: 10 ML | Refills: 0 | Status: SHIPPED | OUTPATIENT
Start: 2018-01-09 | End: 2018-04-18

## 2018-01-09 RX ORDER — MELATONIN
325 2 TIMES DAILY WITH MEALS
Qty: 60 TABLET | Refills: 1 | Status: SHIPPED | OUTPATIENT
Start: 2018-01-09

## 2018-01-09 RX ORDER — DOCUSATE SODIUM 100 MG/1
100 CAPSULE, LIQUID FILLED ORAL 2 TIMES DAILY
Qty: 60 CAPSULE | Refills: 1 | Status: ON HOLD | OUTPATIENT
Start: 2018-01-09 | End: 2019-08-10

## 2018-01-09 RX ORDER — LISINOPRIL 5 MG/1
5 TABLET ORAL DAILY
Qty: 30 TABLET | Refills: 1 | Status: SHIPPED | OUTPATIENT
Start: 2018-01-09 | End: 2018-03-10

## 2018-01-09 RX ORDER — ALLOPURINOL 100 MG/1
100 TABLET ORAL DAILY
Qty: 30 TABLET | Refills: 1 | Status: SHIPPED | OUTPATIENT
Start: 2018-01-09

## 2018-01-09 NOTE — PHYSICAL THERAPY NOTE
PHYSICAL THERAPY TREATMENT NOTE - INPATIENT    Room Number: 321/321-A       Presenting Problem: GI hemorrhage    Problem List  Principal Problem:    Gastrointestinal hemorrhage, unspecified gastrointestinal hemorrhage type  Active Problems:    Anemia    G ASSESSMENT   Ratin          BALANCE                                                                                                                     Static Sitting: Normal  Dynamic Sitting: Normal           Static Standing: Fair +  Dynamic Standing: modified independent with walker - rolling   Goal #2  Current Status     Goal #3 Patient is able to ambulate 200   feet with assist device: walker - rolling at assistance level: modified independent   Goal #3   Current Status     Goal #4 Patient will negot

## 2018-01-09 NOTE — PROGRESS NOTES
[facility]    GI Progress Note      Ezzie Muscat Loss Patient Status:  Inpatient    1930 MRN [id number]   Location [facility] 3W/SW Attending Sherice Dave,    Hosp Day # 3 PCP None Pcp          SUBJECTIVE:     No events overnigh

## 2018-01-09 NOTE — HOME CARE LIAISON
MET WITH PATIENT AND HER DAUGHTER IQRA, [address] BOTH IN AGREEMENT WITH SERVICES BEING PROVIDED BY [facility]. PROVIDED [facility] WITH CONTACT INFORMATION, ALONG WITH LIAISON'S BUSINESS CARD.

## 2018-01-09 NOTE — CM/SW NOTE
1/9/18 CM Discharge planning   Pt and family have decided to return home at discharge instead of return to [facility] for rehab, agreed to [place] [id number] services from [place] [id number]. Referral made to [facility].    Carlos Manuel Freire X [phone]

## 2018-01-10 LAB — BLOOD TYPE BARCODE: 5100

## 2018-01-10 NOTE — DISCHARGE SUMMARY
[facility] Hospitalist Discharge Summary   Patient ID:  Berkley Matt  [id number]  44 year old  2/27/1930    Admit date: 1/6/2018  Discharge date: 1/9/2018  Primary Care Physician: None Pcp   Attending Physician: No att. providers found   Consults:   Consultants poss resume as o/p if remains stable per PCP  -cont statin     Probable CKD  -baseline creat 1-1.6 recently  -stable during stay    HTN  -hold home meds on admit with relative hypotension, resume home meds on d/c     HLD  -statin     TIA/CVA  -resume aspir constipation. cholecalciferol 5000 units Caps  Commonly known as:  VITAMIN D3  Take 1 capsule (5,000 Units total) by mouth daily. docusate sodium 100 MG Caps  Commonly known as:  COLACE  Take 1 capsule (100 mg total) by mouth 2 (two) times daily. 100 MG Caps  · insulin glargine 100 UNIT/ML Sopn  · Insulin Lispro 100 UNIT/ML Soln  · lisinopril 5 MG Tabs  · omeprazole 20 MG Cpdr  · PEG 3350 Pack  · rivaroxaban 15 MG Tabs  · Vitamin B-12 2500 MCG Subl         Important follow up:   Follow-up [employer]

## 2018-01-11 ENCOUNTER — PRIOR ORIGINAL RECORDS (OUTPATIENT)
Dept: OTHER | Age: 83
End: 2018-01-11

## 2018-01-15 ENCOUNTER — PRIOR ORIGINAL RECORDS (OUTPATIENT)
Dept: OTHER | Age: 83
End: 2018-01-15

## 2018-01-15 ENCOUNTER — LAB REQUISITION (OUTPATIENT)
Dept: LAB | Facility: HOSPITAL | Age: 83
End: 2018-01-15
Attending: INTERNAL MEDICINE
Payer: MEDICARE

## 2018-01-15 DIAGNOSIS — K92.2 GASTROINTESTINAL HEMORRHAGE: ICD-10-CM

## 2018-01-15 LAB
BASOPHILS # BLD AUTO: 0.02 X10(3) UL (ref 0–0.1)
BASOPHILS NFR BLD AUTO: 0.4 %
EOSINOPHIL # BLD AUTO: 0.05 X10(3) UL (ref 0–0.3)
EOSINOPHIL NFR BLD AUTO: 0.9 %
ERYTHROCYTE [DISTWIDTH] IN BLOOD BY AUTOMATED COUNT: 18 % (ref 11.5–16)
HCT VFR BLD AUTO: 33.9 % (ref 34–50)
HGB BLD-MCNC: 10.3 G/DL (ref 12–16)
IMMATURE GRANULOCYTE COUNT: 0.03 X10(3) UL (ref 0–1)
IMMATURE GRANULOCYTE RATIO %: 0.5 %
LYMPHOCYTES # BLD AUTO: 2.5 X10(3) UL (ref 0.9–4)
LYMPHOCYTES NFR BLD AUTO: 44.4 %
MCH RBC QN AUTO: 30.5 PG (ref 27–33.2)
MCHC RBC AUTO-ENTMCNC: 30.4 G/DL (ref 31–37)
MCV RBC AUTO: 100.3 FL (ref 81–100)
MONOCYTES # BLD AUTO: 0.67 X10(3) UL (ref 0.1–0.6)
MONOCYTES NFR BLD AUTO: 11.9 %
NEUTROPHIL ABS PRELIM: 2.36 X10 (3) UL (ref 1.3–6.7)
NEUTROPHILS # BLD AUTO: 2.36 X10(3) UL (ref 1.3–6.7)
NEUTROPHILS NFR BLD AUTO: 41.9 %
PLATELET # BLD AUTO: 218 10(3)UL (ref 150–450)
PLATELET MORPHOLOGY: NORMAL
RBC # BLD AUTO: 3.38 X10(6)UL (ref 3.8–5.1)
RED CELL DISTRIBUTION WIDTH-SD: 65.2 FL (ref 35.1–46.3)
WBC # BLD AUTO: 5.6 X10(3) UL (ref 4–13)

## 2018-01-15 PROCEDURE — 85025 COMPLETE CBC W/AUTO DIFF WBC: CPT | Performed by: INTERNAL MEDICINE

## 2018-01-16 ENCOUNTER — PRIOR ORIGINAL RECORDS (OUTPATIENT)
Dept: OTHER | Age: 83
End: 2018-01-16

## 2018-01-16 LAB
HEMATOCRIT: 33.9 %
HEMOGLOBIN: 10.3 G/DL
PLATELETS: 218 K/UL
RED BLOOD COUNT: 3.38 X 10-6/U
WHITE BLOOD COUNT: 5.6 X 10-3/U

## 2018-01-17 ENCOUNTER — OFFICE VISIT (OUTPATIENT)
Dept: ENDOCRINOLOGY CLINIC | Facility: CLINIC | Age: 83
End: 2018-01-17

## 2018-01-17 ENCOUNTER — TELEPHONE (OUTPATIENT)
Dept: ENDOCRINOLOGY CLINIC | Facility: CLINIC | Age: 83
End: 2018-01-17

## 2018-01-17 VITALS
HEART RATE: 69 BPM | HEIGHT: 61 IN | SYSTOLIC BLOOD PRESSURE: 102 MMHG | DIASTOLIC BLOOD PRESSURE: 61 MMHG | BODY MASS INDEX: 38.71 KG/M2 | WEIGHT: 205 LBS

## 2018-01-17 DIAGNOSIS — Z79.4 UNCONTROLLED TYPE 2 DIABETES MELLITUS WITH COMPLICATION, WITH LONG-TERM CURRENT USE OF INSULIN (HCC): Primary | ICD-10-CM

## 2018-01-17 DIAGNOSIS — E11.65 UNCONTROLLED TYPE 2 DIABETES MELLITUS WITH COMPLICATION, WITH LONG-TERM CURRENT USE OF INSULIN (HCC): Primary | ICD-10-CM

## 2018-01-17 DIAGNOSIS — E11.8 UNCONTROLLED TYPE 2 DIABETES MELLITUS WITH COMPLICATION, WITH LONG-TERM CURRENT USE OF INSULIN (HCC): Primary | ICD-10-CM

## 2018-01-17 LAB
GLUCOSE BLOOD: 182
TEST STRIP LOT #: NORMAL NUMERIC

## 2018-01-17 PROCEDURE — 36416 COLLJ CAPILLARY BLOOD SPEC: CPT | Performed by: INTERNAL MEDICINE

## 2018-01-17 PROCEDURE — 82962 GLUCOSE BLOOD TEST: CPT | Performed by: INTERNAL MEDICINE

## 2018-01-17 PROCEDURE — 99204 OFFICE O/P NEW MOD 45 MIN: CPT | Performed by: INTERNAL MEDICINE

## 2018-01-17 NOTE — PROGRESS NOTES
Name: Zuri Zepeda  Date: 1/17/2018    Referring Physician: No ref.  provider found    HISTORY OF PRESENT ILLNESS   Zuri Zepeda is a 80year old female who presents for     She was hospitalized in 11/2017 with severe RSV PNA requiring intubation and long Disp: 30 tablet, Rfl: 1  •  bisacodyl 5 MG Oral Tab EC, Take 1 tablet (5 mg total) by mouth daily as needed for constipation. , Disp: 30 tablet, Rfl: 1  •  Cyanocobalamin (VITAMIN B-12) 2500 MCG Sublingual SL Tab, Place 2,500 mcg under the tongue daily. Jessenia New for blood glucose 160-200 mg/dL Give 2 units for blood glucose 201-240 mg/dL Give 3 units for blood glucose 241-280 mg/dL Give 4 units for blood glucose 281-320 mg/dL Give 5 units for blood glucose 321-360 mg/dL Call primary physician if blood glucose is g no murmurs, S3 or S4  Gastrointestinal:  normal bowel sounds and no palpable masses in abdomen, organomegaly or tenderness   Musculoskeletal:  normal muscle strength and tone  Skin:  normal moisture and skin texture  Hair & Nails:  normal scalp hair     He

## 2018-01-17 NOTE — TELEPHONE ENCOUNTER
Pharmacy received an alert regarding adding an additional diabetes medication to patient of elderly age. Discussed that per SH insulin was stopped and [facility] added so ok to dispense.

## 2018-01-17 NOTE — PATIENT INSTRUCTIONS
Ideal BG range 100-250    Stop [place] and Humalog    Start Januvia 100mg daily in the morning     Call with blood glucose readings in one week    Check 2 times per day - morning and dinner     Call if Blood glucose above 300 on more than one occasion or i

## 2018-01-23 LAB
BUN: 16 MG/DL
CALCIUM: 8.7 MG/DL
CHLORIDE: 106 MEQ/L
CREATININE, SERUM: 0.98 MG/DL
GLUCOSE: 149 MG/DL
HEMATOCRIT: 25.7 %
HEMOGLOBIN: 8.4 G/DL
PLATELETS: 209 K/UL
POTASSIUM, SERUM: 4.3 MEQ/L
RED BLOOD COUNT: 2.76 X 10-6/U
SODIUM: 136 MEQ/L
WHITE BLOOD COUNT: 3.8 X 10-3/U

## 2018-01-24 ENCOUNTER — PATIENT MESSAGE (OUTPATIENT)
Dept: ENDOCRINOLOGY CLINIC | Facility: CLINIC | Age: 83
End: 2018-01-24

## 2018-01-24 NOTE — TELEPHONE ENCOUNTER
Sugars are a little above goal but not bad, lets continue with Januvia for now and send BG levels in 2 weeks. Thanks.

## 2018-01-24 NOTE — TELEPHONE ENCOUNTER
From: Sima White Loss  To: Bishop Rodriguez MD  Sent: 1/24/2018 12:18 PM CST  Subject: Visit Follow-up Question    Dr Wisam Cummings requested my blood glucose readings for one week.     1/18 - 8am 174, 6pm 155  1/19 - 7am 182, 5pm 166  1/20 - 8am 197, 6pm 219  1/21 - 8a

## 2018-01-25 ENCOUNTER — PATIENT MESSAGE (OUTPATIENT)
Dept: ENDOCRINOLOGY CLINIC | Facility: CLINIC | Age: 83
End: 2018-01-25

## 2018-01-25 RX ORDER — GLIMEPIRIDE 2 MG/1
2 TABLET ORAL
Qty: 30 TABLET | Refills: 6 | Status: SHIPPED | OUTPATIENT
Start: 2018-01-25 | End: 2018-02-02

## 2018-01-25 NOTE — TELEPHONE ENCOUNTER
She is having some really good days and bad days which is likely related to changes in food intake. But if she is concerned lets try adding Glimepiride 2mg PO daily, #30 refill 6 to see if sugars improve and send levels in 2 weeks. Thanks.

## 2018-01-25 NOTE — TELEPHONE ENCOUNTER
Please see patient's message. Per additional email yesterday patient was concerned about continuing with [place] and [place] for 2 more weeks.

## 2018-01-25 NOTE — TELEPHONE ENCOUNTER
My chart messged to patient to try adding Glimepiride 2mg PO daily #30 refill 6. Order sent to pharm per [facility] written.

## 2018-01-25 NOTE — TELEPHONE ENCOUNTER
Please see patient's message. She is concerned that continuing with Catheline Gear will have an effect on her body/kidneys.

## 2018-01-25 NOTE — TELEPHONE ENCOUNTER
From: Rhea Rincon Loss  To: Ann Campos MD  Sent: 1/25/2018 10:27 AM CST  Subject: Visit Follow-up Question    Hi, my sugar was 246 this morning. I've been trying to follow a low carb diet. I'm concerned about another 2 weeks of just taking Januvia.  Is ther

## 2018-02-01 NOTE — PROGRESS NOTES
Spoke with pt regarding test results and recommendations as noted per Dr. Fili Forbes. Pt agreed. Pt verbalized understanding.

## 2018-02-02 ENCOUNTER — PATIENT MESSAGE (OUTPATIENT)
Dept: ENDOCRINOLOGY CLINIC | Facility: CLINIC | Age: 83
End: 2018-02-02

## 2018-02-02 ENCOUNTER — LAB REQUISITION (OUTPATIENT)
Dept: LAB | Facility: HOSPITAL | Age: 83
End: 2018-02-02
Payer: MEDICARE

## 2018-02-02 DIAGNOSIS — D64.9 ANEMIA: ICD-10-CM

## 2018-02-02 DIAGNOSIS — R25.1 TREMOR: ICD-10-CM

## 2018-02-02 LAB
ALBUMIN SERPL BCP-MCNC: 3.4 G/DL (ref 3.5–4.8)
ALBUMIN/GLOB SERPL: 1.5 {RATIO} (ref 1–2)
ALP SERPL-CCNC: 66 U/L (ref 32–100)
ALT SERPL-CCNC: 14 U/L (ref 14–54)
ANION GAP SERPL CALC-SCNC: 11 MMOL/L (ref 0–18)
AST SERPL-CCNC: 18 U/L (ref 15–41)
BASOPHILS # BLD: 0 K/UL (ref 0–0.2)
BASOPHILS NFR BLD: 0 %
BILIRUB SERPL-MCNC: 0.5 MG/DL (ref 0.3–1.2)
BUN SERPL-MCNC: 47 MG/DL (ref 8–20)
BUN/CREAT SERPL: 29 (ref 10–20)
CALCIUM SERPL-MCNC: 9.3 MG/DL (ref 8.5–10.5)
CHLORIDE SERPL-SCNC: 99 MMOL/L (ref 95–110)
CO2 SERPL-SCNC: 27 MMOL/L (ref 22–32)
CREAT SERPL-MCNC: 1.62 MG/DL (ref 0.5–1.5)
EOSINOPHIL # BLD: 0 K/UL (ref 0–0.7)
EOSINOPHIL NFR BLD: 1 %
ERYTHROCYTE [DISTWIDTH] IN BLOOD BY AUTOMATED COUNT: 18.2 % (ref 11–15)
GLOBULIN PLAS-MCNC: 2.3 G/DL (ref 2.5–3.7)
GLUCOSE SERPL-MCNC: 331 MG/DL (ref 70–99)
HCT VFR BLD AUTO: 32.1 % (ref 35–48)
HGB BLD-MCNC: 10.4 G/DL (ref 12–16)
LYMPHOCYTES # BLD: 1.5 K/UL (ref 1–4)
LYMPHOCYTES NFR BLD: 30 %
MCH RBC QN AUTO: 30.2 PG (ref 27–32)
MCHC RBC AUTO-ENTMCNC: 32.3 G/DL (ref 32–37)
MCV RBC AUTO: 93.5 FL (ref 80–100)
MONOCYTES # BLD: 0.5 K/UL (ref 0–1)
MONOCYTES NFR BLD: 10 %
NEUTROPHILS # BLD AUTO: 2.9 K/UL (ref 1.8–7.7)
NEUTROPHILS NFR BLD: 59 %
OSMOLALITY UR CALC.SUM OF ELEC: 309 MOSM/KG (ref 275–295)
PLATELET # BLD AUTO: 159 K/UL (ref 140–400)
PMV BLD AUTO: 8 FL (ref 7.4–10.3)
POTASSIUM SERPL-SCNC: 4.7 MMOL/L (ref 3.3–5.1)
PROT SERPL-MCNC: 5.7 G/DL (ref 5.9–8.4)
RBC # BLD AUTO: 3.43 M/UL (ref 3.7–5.4)
SODIUM SERPL-SCNC: 137 MMOL/L (ref 136–144)
TSH SERPL-ACNC: 1.78 UIU/ML (ref 0.45–5.33)
WBC # BLD AUTO: 5 K/UL (ref 4–11)

## 2018-02-02 PROCEDURE — 85025 COMPLETE CBC W/AUTO DIFF WBC: CPT | Performed by: INTERNAL MEDICINE

## 2018-02-02 PROCEDURE — 80053 COMPREHEN METABOLIC PANEL: CPT | Performed by: INTERNAL MEDICINE

## 2018-02-02 PROCEDURE — 84443 ASSAY THYROID STIM HORMONE: CPT | Performed by: INTERNAL MEDICINE

## 2018-02-02 RX ORDER — GLIMEPIRIDE 4 MG/1
4 TABLET ORAL
Qty: 30 TABLET | Refills: 2 | Status: SHIPPED | OUTPATIENT
Start: 2018-02-02 | End: 2018-02-19

## 2018-02-02 NOTE — TELEPHONE ENCOUNTER
From: Verta Backbone Loss  To: Jannelle Cheadle, MD  Sent: 2/2/2018 10:31 AM CST  Subject: Visit Follow-up Question    Good Morning,    I wanted to touch bases with Dr. Anne-Marie Boss regarding my sugar levels. I started taking Glimepiride, 2MG on Jan 26.  Here is a list of m

## 2018-02-05 ENCOUNTER — TELEPHONE (OUTPATIENT)
Dept: ENDOCRINOLOGY CLINIC | Facility: CLINIC | Age: 83
End: 2018-02-05

## 2018-02-05 NOTE — TELEPHONE ENCOUNTER
Beth Ramon - daughter called this am    RN advised Beth Ramon according to note below from [facility]:    c/w Glimepiride 4 mp PO AQM, restart Tresiba 10 units SQ QHS and finish Januvia but do not refill.  Pt will take Tresiba 10 units this am and start daily dose Tresiba 10

## 2018-02-05 NOTE — TELEPHONE ENCOUNTER
Reinier/MAYELA called to verify orders for medication change/glimepiride. Please call.         Current Outpatient Prescriptions:   •  glimepiride 4 MG Oral Tab, Take 1 tablet (4 mg total) by mouth every morning before breakfast., Disp: 30 tablet, Rfl: 2

## 2018-02-05 NOTE — TELEPHONE ENCOUNTER
Noted.  Unfortunately I suspect we need to restart some insulin. Please continue Glimepiride 4mg PO QAM and restart Tresiba 10 units SQ QHS. Ok to Evomail but do not refill.

## 2018-02-05 NOTE — TELEPHONE ENCOUNTER
Patient's granddaughter called Sunday pm  Bg was 480, repeat 447 pre dinner  Sugars have been in the 200-300 before this  She is on Januvia and Glimepiride 4 mg in am.   I asked her to take 4 mg in pm  Repeat BG at bedtime and middle of the night  Call if

## 2018-02-06 NOTE — TELEPHONE ENCOUNTER
Spoke with Shaista. RN advised Shaista of note from provider of recent med changes. Shaista wrote down instructions and readback correctly.     Glipepiride 4 mg PO in AM  Tresiba 10 units SQ in PM  Ok to c/w Januvia but do not refill    Reinier reports patient BG w

## 2018-02-07 ENCOUNTER — LAB REQUISITION (OUTPATIENT)
Dept: LAB | Facility: HOSPITAL | Age: 83
End: 2018-02-07
Payer: MEDICARE

## 2018-02-07 ENCOUNTER — PRIOR ORIGINAL RECORDS (OUTPATIENT)
Dept: OTHER | Age: 83
End: 2018-02-07

## 2018-02-07 DIAGNOSIS — R25.1 TREMOR: ICD-10-CM

## 2018-02-07 LAB
ALBUMIN SERPL-MCNC: 3.3 G/DL (ref 3.5–4.8)
ALP LIVER SERPL-CCNC: 83 U/L (ref 55–142)
ALT SERPL-CCNC: 18 U/L (ref 14–54)
AST SERPL-CCNC: 12 U/L (ref 15–41)
BILIRUB SERPL-MCNC: 0.3 MG/DL (ref 0.1–2)
BUN BLD-MCNC: 49 MG/DL (ref 8–20)
CALCIUM BLD-MCNC: 9.1 MG/DL (ref 8.3–10.3)
CHLORIDE: 102 MMOL/L (ref 101–111)
CO2: 26 MMOL/L (ref 22–32)
CREAT BLD-MCNC: 1.69 MG/DL (ref 0.55–1.02)
GLUCOSE BLD-MCNC: 248 MG/DL (ref 70–99)
M PROTEIN MFR SERPL ELPH: 6.3 G/DL (ref 6.1–8.3)
POTASSIUM SERPL-SCNC: 4.6 MMOL/L (ref 3.6–5.1)
SODIUM SERPL-SCNC: 137 MMOL/L (ref 136–144)

## 2018-02-07 PROCEDURE — 80053 COMPREHEN METABOLIC PANEL: CPT | Performed by: INTERNAL MEDICINE

## 2018-02-14 LAB
ALBUMIN: 3.3 G/DL
ALKALINE PHOSPHATATE(ALK PHOS): 83 IU/L
BILIRUBIN TOTAL: 0.3 MG/DL
BUN: 49 MG/DL
CALCIUM: 9.1 MG/DL
CHLORIDE: 102 MEQ/L
CREATININE, SERUM: 1.69 MG/DL
GLUCOSE: 248 MG/DL
POTASSIUM, SERUM: 4.6 MEQ/L
PROTEIN, TOTAL: 6.3 G/DL
SGOT (AST): 12 IU/L
SGPT (ALT): 18 IU/L
SODIUM: 137 MEQ/L

## 2018-02-15 ENCOUNTER — PATIENT MESSAGE (OUTPATIENT)
Dept: ENDOCRINOLOGY CLINIC | Facility: CLINIC | Age: 83
End: 2018-02-15

## 2018-02-15 ENCOUNTER — PRIOR ORIGINAL RECORDS (OUTPATIENT)
Dept: OTHER | Age: 83
End: 2018-02-15

## 2018-02-15 ENCOUNTER — TELEPHONE (OUTPATIENT)
Dept: ENDOCRINOLOGY CLINIC | Facility: CLINIC | Age: 83
End: 2018-02-15

## 2018-02-15 DIAGNOSIS — E11.8 UNCONTROLLED TYPE 2 DIABETES MELLITUS WITH COMPLICATION, WITH LONG-TERM CURRENT USE OF INSULIN (HCC): Primary | ICD-10-CM

## 2018-02-15 DIAGNOSIS — E11.65 UNCONTROLLED TYPE 2 DIABETES MELLITUS WITH COMPLICATION, WITH LONG-TERM CURRENT USE OF INSULIN (HCC): Primary | ICD-10-CM

## 2018-02-15 DIAGNOSIS — Z79.4 UNCONTROLLED TYPE 2 DIABETES MELLITUS WITH COMPLICATION, WITH LONG-TERM CURRENT USE OF INSULIN (HCC): Primary | ICD-10-CM

## 2018-02-15 NOTE — TELEPHONE ENCOUNTER
Pts daughter Anthony Salgado called w/ questions about questions about insurance coverage/Zohaib sensor placement. Please call.

## 2018-02-15 NOTE — TELEPHONE ENCOUNTER
From: Becky Barba Loss  To: Nguyen Ott MD  Sent: 2/15/2018 7:05 AM CST  Subject: Visit Follow-up Question    Good Morning,    Per our last conversation, here are my glucose levels since I started taking 10 units of Tresiba at dinner time.   The time of day

## 2018-02-15 NOTE — TELEPHONE ENCOUNTER
Please increase Tresiba to 14 units SQ daily and lets schedule RN visit to [facility] so we can see what is going on at home. Please go ahead and stop Januvia.

## 2018-02-15 NOTE — TELEPHONE ENCOUNTER
Spoke with Sadia Santos. She wanted to discuss Queen sensor placement. Discussed that insurance including medicare cover professional CGM once every 6 months but can always contact insurance to be sure. Discussed also process of placement and wearing sensor.  No f

## 2018-02-15 NOTE — TELEPHONE ENCOUNTER
Sent my chart message responding to patient with instructions from [facility]. Tresiba dost 14 units SQ daily. Stop Januvia. Call to book Bret sensor placement for nurse only apt. BMP ordered per [facility] written. Enc closed.

## 2018-02-16 ENCOUNTER — APPOINTMENT (OUTPATIENT)
Dept: LAB | Facility: HOSPITAL | Age: 83
End: 2018-02-16
Attending: INTERNAL MEDICINE
Payer: MEDICARE

## 2018-02-16 DIAGNOSIS — Z79.4 UNCONTROLLED TYPE 2 DIABETES MELLITUS WITH COMPLICATION, WITH LONG-TERM CURRENT USE OF INSULIN (HCC): ICD-10-CM

## 2018-02-16 DIAGNOSIS — E11.65 UNCONTROLLED TYPE 2 DIABETES MELLITUS WITH COMPLICATION, WITH LONG-TERM CURRENT USE OF INSULIN (HCC): ICD-10-CM

## 2018-02-16 DIAGNOSIS — E11.8 UNCONTROLLED TYPE 2 DIABETES MELLITUS WITH COMPLICATION, WITH LONG-TERM CURRENT USE OF INSULIN (HCC): ICD-10-CM

## 2018-02-16 LAB
ANION GAP SERPL CALC-SCNC: 10 MMOL/L (ref 0–18)
BUN SERPL-MCNC: 45 MG/DL (ref 8–20)
BUN/CREAT SERPL: 28.8 (ref 10–20)
CALCIUM SERPL-MCNC: 9 MG/DL (ref 8.5–10.5)
CHLORIDE SERPL-SCNC: 99 MMOL/L (ref 95–110)
CO2 SERPL-SCNC: 28 MMOL/L (ref 22–32)
CREAT SERPL-MCNC: 1.56 MG/DL (ref 0.5–1.5)
GLUCOSE SERPL-MCNC: 141 MG/DL (ref 70–99)
OSMOLALITY UR CALC.SUM OF ELEC: 298 MOSM/KG (ref 275–295)
POTASSIUM SERPL-SCNC: 3.8 MMOL/L (ref 3.3–5.1)
SODIUM SERPL-SCNC: 137 MMOL/L (ref 136–144)

## 2018-02-16 PROCEDURE — 36415 COLL VENOUS BLD VENIPUNCTURE: CPT

## 2018-02-16 PROCEDURE — 80048 BASIC METABOLIC PNL TOTAL CA: CPT

## 2018-02-19 ENCOUNTER — TELEPHONE (OUTPATIENT)
Dept: ENDOCRINOLOGY CLINIC | Facility: CLINIC | Age: 83
End: 2018-02-19

## 2018-02-19 RX ORDER — GLIMEPIRIDE 4 MG/1
4 TABLET ORAL
Qty: 90 TABLET | Refills: 0 | Status: SHIPPED | OUTPATIENT
Start: 2018-02-19 | End: 2018-05-21

## 2018-02-19 NOTE — TELEPHONE ENCOUNTER
Current Outpatient Prescriptions:  glimepiride 4 MG Oral Tab Take 1 tablet (4 mg total) by mouth every morning before breakfast. Disp: 30 tablet Rfl: 2       FAX says 2mg tabs- PL CLARIFY

## 2018-02-21 ENCOUNTER — TELEPHONE (OUTPATIENT)
Dept: ENDOCRINOLOGY CLINIC | Facility: CLINIC | Age: 83
End: 2018-02-21

## 2018-02-21 ENCOUNTER — NURSE ONLY (OUTPATIENT)
Dept: ENDOCRINOLOGY CLINIC | Facility: CLINIC | Age: 83
End: 2018-02-21

## 2018-02-21 DIAGNOSIS — E11.8 UNCONTROLLED TYPE 2 DIABETES MELLITUS WITH COMPLICATION, WITH LONG-TERM CURRENT USE OF INSULIN (HCC): Primary | ICD-10-CM

## 2018-02-21 DIAGNOSIS — E11.65 UNCONTROLLED TYPE 2 DIABETES MELLITUS WITH COMPLICATION, WITH LONG-TERM CURRENT USE OF INSULIN (HCC): Primary | ICD-10-CM

## 2018-02-21 DIAGNOSIS — Z79.4 UNCONTROLLED TYPE 2 DIABETES MELLITUS WITH COMPLICATION, WITH LONG-TERM CURRENT USE OF INSULIN (HCC): Primary | ICD-10-CM

## 2018-02-21 PROCEDURE — 95250 CONT GLUC MNTR PHYS/QHP EQP: CPT | Performed by: INTERNAL MEDICINE

## 2018-02-21 NOTE — PROGRESS NOTES
Patient presents to clinic today for [facility] sensor placement [id number]. Patient will wear Zohaib sensor for 14 days. Sensor placed on right upper arm and patient tolerated well.  Patient is aware to keep sensor and to bring back to clinic if it

## 2018-02-21 NOTE — TELEPHONE ENCOUNTER
Called pt to reschedule aleksandar download to 3/7 in [facility] per [facility]. Spoke with Geovani Ferguson, pts daughter and she agreed to moving the apt to 3/7/18. RN advised if patient cannot tolerate to call sooner for removal of sensor.

## 2018-02-26 ENCOUNTER — TELEPHONE (OUTPATIENT)
Dept: ENDOCRINOLOGY CLINIC | Facility: CLINIC | Age: 83
End: 2018-02-26

## 2018-02-26 ENCOUNTER — NURSE ONLY (OUTPATIENT)
Dept: NEPHROLOGY | Facility: CLINIC | Age: 83
End: 2018-02-26

## 2018-02-26 DIAGNOSIS — E11.65 UNCONTROLLED TYPE 2 DIABETES MELLITUS WITH COMPLICATION, UNSPECIFIED LONG TERM INSULIN USE STATUS: Primary | ICD-10-CM

## 2018-02-26 DIAGNOSIS — E11.8 UNCONTROLLED TYPE 2 DIABETES MELLITUS WITH COMPLICATION, UNSPECIFIED LONG TERM INSULIN USE STATUS: Primary | ICD-10-CM

## 2018-02-26 NOTE — PROGRESS NOTES
See TE from today 2/26. Sensor downloaded but per [facility] unfortunately not enough data. Sensor replaced with SN [id number]. Patient will keep appt on 3/7 for download.

## 2018-02-26 NOTE — TELEPHONE ENCOUNTER
Spoke with [employer]. Booked patient for RN visit in [place] to download and replace sensor if necessary.

## 2018-02-26 NOTE — TELEPHONE ENCOUNTER
Pts daughter/Saira calling for pt requesting to rhea Polanco in regards to pt coming in this afternoon. Pls call at:[phone],thanks.

## 2018-02-26 NOTE — TELEPHONE ENCOUNTER
Spoke with Paula Simmons. They wanted to come today for sensor downlaod instead of Wednesday. Changed RN appt to this afternoon.

## 2018-02-26 NOTE — TELEPHONE ENCOUNTER
Spoke with Mauro Dickinson (daughter) she states patient's Zohaib sensor fell off last night while she was sleeping. She wants to know if the Wed-Sun is enough data or if you would recommend we put sensor back on.  Patient has appt for follow up and download not this

## 2018-02-28 ENCOUNTER — PATIENT MESSAGE (OUTPATIENT)
Dept: ENDOCRINOLOGY CLINIC | Facility: CLINIC | Age: 83
End: 2018-02-28

## 2018-03-01 NOTE — TELEPHONE ENCOUNTER
From: Castillo White RN  To: Ginny Vernon Loss  Sent: 2018 9:48 AM CST  Subject: Blood sugar    Good Morning,   Dr. Jocelyn Clancy asked me to check in with you today to see if your blood sugars are better after last night? Thanks!     Desirae Cruz RN

## 2018-03-02 NOTE — TELEPHONE ENCOUNTER
FYI patient responding from email asking if BG improved after she paged with elevated numbers.  She has appt on 3/7 for [facility] sensor download

## 2018-03-05 ENCOUNTER — LAB REQUISITION (OUTPATIENT)
Dept: LAB | Age: 83
End: 2018-03-05
Attending: INTERNAL MEDICINE
Payer: MEDICARE

## 2018-03-05 DIAGNOSIS — I48.0 PAROXYSMAL ATRIAL FIBRILLATION (HCC): ICD-10-CM

## 2018-03-05 DIAGNOSIS — I25.10 ATHEROSCLEROTIC HEART DISEASE OF NATIVE CORONARY ARTERY WITHOUT ANGINA PECTORIS: ICD-10-CM

## 2018-03-05 DIAGNOSIS — E11.9 TYPE 2 DIABETES MELLITUS WITHOUT COMPLICATIONS (HCC): ICD-10-CM

## 2018-03-05 LAB
ALBUMIN SERPL-MCNC: 3.3 G/DL (ref 3.5–4.8)
ALP LIVER SERPL-CCNC: 77 U/L (ref 55–142)
ALT SERPL-CCNC: 14 U/L (ref 14–54)
AST SERPL-CCNC: 15 U/L (ref 15–41)
BASOPHILS # BLD AUTO: 0.01 X10(3) UL (ref 0–0.1)
BASOPHILS NFR BLD AUTO: 0.2 %
BILIRUB SERPL-MCNC: 0.3 MG/DL (ref 0.1–2)
BUN BLD-MCNC: 82 MG/DL (ref 8–20)
CALCIUM BLD-MCNC: 9.1 MG/DL (ref 8.3–10.3)
CHLORIDE: 101 MMOL/L (ref 101–111)
CO2: 25 MMOL/L (ref 22–32)
CREAT BLD-MCNC: 1.75 MG/DL (ref 0.55–1.02)
EOSINOPHIL # BLD AUTO: 0.04 X10(3) UL (ref 0–0.3)
EOSINOPHIL NFR BLD AUTO: 0.7 %
ERYTHROCYTE [DISTWIDTH] IN BLOOD BY AUTOMATED COUNT: 17.8 % (ref 11.5–16)
EST. AVERAGE GLUCOSE BLD GHB EST-MCNC: 114 MG/DL (ref 68–126)
GLUCOSE BLD-MCNC: 170 MG/DL (ref 70–99)
HBA1C MFR BLD HPLC: 5.6 % (ref ?–5.7)
HCT VFR BLD AUTO: 23 % (ref 34–50)
HGB BLD-MCNC: 7.1 G/DL (ref 12–16)
IMMATURE GRANULOCYTE COUNT: 0.03 X10(3) UL (ref 0–1)
IMMATURE GRANULOCYTE RATIO %: 0.5 %
LYMPHOCYTES # BLD AUTO: 2.13 X10(3) UL (ref 0.9–4)
LYMPHOCYTES NFR BLD AUTO: 36.3 %
M PROTEIN MFR SERPL ELPH: 6.4 G/DL (ref 6.1–8.3)
MCH RBC QN AUTO: 32.1 PG (ref 27–33.2)
MCHC RBC AUTO-ENTMCNC: 30.9 G/DL (ref 31–37)
MCV RBC AUTO: 104.1 FL (ref 81–100)
MONOCYTES # BLD AUTO: 0.57 X10(3) UL (ref 0.1–1)
MONOCYTES NFR BLD AUTO: 9.7 %
NEUTROPHIL ABS PRELIM: 3.08 X10 (3) UL (ref 1.3–6.7)
NEUTROPHILS # BLD AUTO: 3.08 X10(3) UL (ref 1.3–6.7)
NEUTROPHILS NFR BLD AUTO: 52.6 %
PLATELET # BLD AUTO: 180 10(3)UL (ref 150–450)
PLATELET MORPHOLOGY: NORMAL
POTASSIUM SERPL-SCNC: 5.1 MMOL/L (ref 3.6–5.1)
RBC # BLD AUTO: 2.21 X10(6)UL (ref 3.8–5.1)
RED CELL DISTRIBUTION WIDTH-SD: 67.6 FL (ref 35.1–46.3)
SODIUM SERPL-SCNC: 136 MMOL/L (ref 136–144)
WBC # BLD AUTO: 5.9 X10(3) UL (ref 4–13)

## 2018-03-05 PROCEDURE — 80053 COMPREHEN METABOLIC PANEL: CPT | Performed by: INTERNAL MEDICINE

## 2018-03-05 PROCEDURE — 83036 HEMOGLOBIN GLYCOSYLATED A1C: CPT | Performed by: INTERNAL MEDICINE

## 2018-03-05 PROCEDURE — 85025 COMPLETE CBC W/AUTO DIFF WBC: CPT | Performed by: INTERNAL MEDICINE

## 2018-03-05 NOTE — TELEPHONE ENCOUNTER
Late documentation - last night patient paged with  and recommended she increase Tresiba to 26 units SQ and increase hydration. BG level 2 hours later was improved to 370. Please ask her to change Tresiba dose to 20 units SQ QHS. Thanks.

## 2018-03-07 ENCOUNTER — TELEPHONE (OUTPATIENT)
Dept: ENDOCRINOLOGY CLINIC | Facility: CLINIC | Age: 83
End: 2018-03-07

## 2018-03-07 ENCOUNTER — HOSPITAL ENCOUNTER (INPATIENT)
Facility: HOSPITAL | Age: 83
LOS: 3 days | Discharge: HOME HEALTH | DRG: 378 | End: 2018-03-10
Attending: EMERGENCY MEDICINE | Admitting: HOSPITALIST
Payer: MEDICARE

## 2018-03-07 DIAGNOSIS — D64.9 SYMPTOMATIC ANEMIA: Primary | ICD-10-CM

## 2018-03-07 DIAGNOSIS — K92.2 GASTROINTESTINAL HEMORRHAGE, UNSPECIFIED GASTROINTESTINAL HEMORRHAGE TYPE: ICD-10-CM

## 2018-03-07 LAB
ANION GAP SERPL CALC-SCNC: 7 MMOL/L (ref 0–18)
ANTIBODY SCREEN: NEGATIVE
BILIRUB UR QL: NEGATIVE
BUN SERPL-MCNC: 77 MG/DL (ref 8–20)
BUN/CREAT SERPL: 43.8 (ref 10–20)
CALCIUM SERPL-MCNC: 8.9 MG/DL (ref 8.5–10.5)
CHLORIDE SERPL-SCNC: 103 MMOL/L (ref 95–110)
CLARITY UR: CLEAR
CO2 SERPL-SCNC: 27 MMOL/L (ref 22–32)
COLOR UR: YELLOW
CREAT SERPL-MCNC: 1.76 MG/DL (ref 0.5–1.5)
FERRITIN SERPL IA-MCNC: 12 NG/ML (ref 11–307)
FOLATE SERPL-MCNC: 16.9 NG/ML
GLUCOSE BLDC GLUCOMTR-MCNC: 151 MG/DL (ref 70–99)
GLUCOSE BLDC GLUCOMTR-MCNC: 209 MG/DL (ref 70–99)
GLUCOSE SERPL-MCNC: 151 MG/DL (ref 70–99)
GLUCOSE UR-MCNC: NEGATIVE MG/DL
HGB UR QL STRIP.AUTO: NEGATIVE
INR BLD: 1.6 (ref 0.9–1.2)
IRON SATN MFR SERPL: 3 % (ref 15–50)
IRON SERPL-MCNC: 15 MCG/DL (ref 28–170)
KETONES UR-MCNC: NEGATIVE MG/DL
LEUKOCYTE ESTERASE UR QL STRIP.AUTO: NEGATIVE
NITRITE UR QL STRIP.AUTO: NEGATIVE
OSMOLALITY UR CALC.SUM OF ELEC: 310 MOSM/KG (ref 275–295)
PH UR: 5 [PH] (ref 5–8)
POTASSIUM SERPL-SCNC: 4.4 MMOL/L (ref 3.3–5.1)
PROT UR-MCNC: NEGATIVE MG/DL
PROTHROMBIN TIME: 18.5 SECONDS (ref 11.8–14.5)
RH BLOOD TYPE: POSITIVE
SODIUM SERPL-SCNC: 137 MMOL/L (ref 136–144)
SP GR UR STRIP: 1.01 (ref 1–1.03)
TIBC SERPL-MCNC: 440 MCG/DL (ref 228–428)
TRANSFERRIN SERPL-MCNC: 333 MG/DL (ref 192–382)
TSH SERPL-ACNC: 2.35 UIU/ML (ref 0.45–5.33)
UROBILINOGEN UR STRIP-ACNC: <2
VIT B12 SERPL-MCNC: >1500 PG/ML (ref 181–914)
VIT C UR-MCNC: NEGATIVE MG/DL

## 2018-03-07 PROCEDURE — 99223 1ST HOSP IP/OBS HIGH 75: CPT | Performed by: HOSPITALIST

## 2018-03-07 PROCEDURE — 30233N1 TRANSFUSION OF NONAUTOLOGOUS RED BLOOD CELLS INTO PERIPHERAL VEIN, PERCUTANEOUS APPROACH: ICD-10-PCS | Performed by: EMERGENCY MEDICINE

## 2018-03-07 RX ORDER — ACETAMINOPHEN 325 MG/1
650 TABLET ORAL EVERY 6 HOURS PRN
Status: DISCONTINUED | OUTPATIENT
Start: 2018-03-07 | End: 2018-03-10

## 2018-03-07 RX ORDER — DEXTROSE MONOHYDRATE 25 G/50ML
50 INJECTION, SOLUTION INTRAVENOUS AS NEEDED
Status: DISCONTINUED | OUTPATIENT
Start: 2018-03-07 | End: 2018-03-10

## 2018-03-07 RX ORDER — ONDANSETRON 2 MG/ML
4 INJECTION INTRAMUSCULAR; INTRAVENOUS EVERY 6 HOURS PRN
Status: DISCONTINUED | OUTPATIENT
Start: 2018-03-07 | End: 2018-03-10

## 2018-03-07 RX ORDER — SODIUM CHLORIDE 9 MG/ML
INJECTION, SOLUTION INTRAVENOUS CONTINUOUS
Status: DISCONTINUED | OUTPATIENT
Start: 2018-03-07 | End: 2018-03-09

## 2018-03-07 RX ORDER — SODIUM CHLORIDE 0.9 % (FLUSH) 0.9 %
3 SYRINGE (ML) INJECTION AS NEEDED
Status: DISCONTINUED | OUTPATIENT
Start: 2018-03-07 | End: 2018-03-10

## 2018-03-07 NOTE — CONSULTS
[facility] - [facility]    Report of Consultation    Ginny Zepeda Patient Status:  Emergency    1930 MRN [id number]   Location [address] Attending Yenifer Durand MD   Hosp Day # 0 PCP Lavonne Cheney     Date of Admiss N/A      Comment: Procedure: COLONOSCOPY;  Surgeon: Flora Green MD;  Location: [address] [place] [address] ENDOSCOPY  01/2018: EGD    Family History  Family History   Problem Relation Age of Onset   • Cancer Father    • Arrhythmia Son        Social History  Smo Xarelto  Monitor Hb, transfusion      Recommendations:  EGD on 3-9 to allow for Xarelto to wear off    Thank you for allowing me to participate in the care of your patient.     ROSIBEL CLEMENS  3/7/2018

## 2018-03-07 NOTE — ED INITIAL ASSESSMENT (HPI)
Patient told to come to ED by PCP for Hemoglobin of 7.1. Patient pale, weak and tired per family.      Has had episodes of black stool, on xarelto

## 2018-03-07 NOTE — TELEPHONE ENCOUNTER
Spoke with [employer]. Informed her ok to bring sensor in if she would like. She is waiting to here from PCP and may be taking mom ER/hospital based on lab results. She will either bring today or tomorrow in [facility].

## 2018-03-07 NOTE — TELEPHONE ENCOUNTER
Saira/pts daughter states that pt is ill today and can't come in for her appt this afternoon. Brody Hayward would like to know if she can remove Zohaib sensor and bring it to [place] office this afternoon. Attempted to call RN/no answer. Please call.

## 2018-03-07 NOTE — ED PROVIDER NOTES
Patient Seen in: [facility] Emergency Department    History   Patient presents with:  Anemia (hematologic)    Stated Complaint: anemia     HPI    79 yo F with PMH afib on xarelto/amiodarone, DM, HTN, HL, CAD s/p PCI off ASA therapy 2/2 GIB, HL prese Take 1 tablet (15 mg total) by mouth daily with food.    Insulin Lispro (HUMALOG) 100 UNIT/ML Subcutaneous Solution,  Give 1 unit for blood glucose 160-200 mg/dL Give 2 units for blood glucose 201-240 mg/dL Give 3 units for blood glucose 241-280 mg/dL Give MG/5ML Oral Suspension,  Take 30 mL by mouth 3 (three) times daily as needed for Indigestion.        Family History   Problem Relation Age of Onset   • Cancer Father    • Arrhythmia Son        Smoking status: Former Smoker Calculated Osmolality 310 (*)     GFR, Non- 25 (*)     GFR, -American 29 (*)     All other components within normal limits   PROTHROMBIN TIME (PT) - Abnormal; Notable for the following:     PT 18.5 (*)     INR 1.6 (*)     All other c -----------         ------                     [address] (BLOOD TYPE)[[id number]]                               Final result               ANTIBODY SCREEN[[id number]]                                  Final result                 Please view results for these eden

## 2018-03-07 NOTE — TELEPHONE ENCOUNTER
Patient's son dropped off sensor. They will be taking Elvia Small to ED for low hemoglobin. They are asking if [facility] can review download tomorrow morning and call daughter Leopoldo Fu with recommendations.  They are anxious about reports given probably hospitalization

## 2018-03-07 NOTE — TELEPHONE ENCOUNTER
Dr. Honorio Villa please see below. Would this be ok to do? Patient could reschedule and we could download beforehand. There is an 11:30am appt available in 2 weeks in [place]. I put a hold on it just in case.

## 2018-03-08 ENCOUNTER — PRIOR ORIGINAL RECORDS (OUTPATIENT)
Dept: OTHER | Age: 83
End: 2018-03-08

## 2018-03-08 ENCOUNTER — TELEPHONE (OUTPATIENT)
Dept: NEUROLOGY | Facility: CLINIC | Age: 83
End: 2018-03-08

## 2018-03-08 LAB
ANION GAP SERPL CALC-SCNC: 9 MMOL/L (ref 0–18)
BASOPHILS # BLD: 0 K/UL (ref 0–0.2)
BASOPHILS # BLD: 0 K/UL (ref 0–0.2)
BASOPHILS NFR BLD: 0 %
BASOPHILS NFR BLD: 0 %
BUN SERPL-MCNC: 65 MG/DL (ref 8–20)
BUN/CREAT SERPL: 40.4 (ref 10–20)
CALCIUM SERPL-MCNC: 8.9 MG/DL (ref 8.5–10.5)
CHLORIDE SERPL-SCNC: 107 MMOL/L (ref 95–110)
CO2 SERPL-SCNC: 26 MMOL/L (ref 22–32)
CREAT SERPL-MCNC: 1.61 MG/DL (ref 0.5–1.5)
EOSINOPHIL # BLD: 0 K/UL (ref 0–0.7)
EOSINOPHIL # BLD: 0.1 K/UL (ref 0–0.7)
EOSINOPHIL NFR BLD: 1 %
EOSINOPHIL NFR BLD: 1 %
ERYTHROCYTE [DISTWIDTH] IN BLOOD BY AUTOMATED COUNT: 17.8 % (ref 11–15)
ERYTHROCYTE [DISTWIDTH] IN BLOOD BY AUTOMATED COUNT: 18.4 % (ref 11–15)
GLUCOSE BLDC GLUCOMTR-MCNC: 107 MG/DL (ref 70–99)
GLUCOSE BLDC GLUCOMTR-MCNC: 131 MG/DL (ref 70–99)
GLUCOSE BLDC GLUCOMTR-MCNC: 134 MG/DL (ref 70–99)
GLUCOSE BLDC GLUCOMTR-MCNC: 292 MG/DL (ref 70–99)
GLUCOSE SERPL-MCNC: 103 MG/DL (ref 70–99)
HBA1C MFR BLD: 5.2 % (ref 4–6)
HCT VFR BLD AUTO: 20.6 % (ref 35–48)
HCT VFR BLD AUTO: 24.4 % (ref 35–48)
HGB BLD-MCNC: 6.8 G/DL (ref 12–16)
HGB BLD-MCNC: 8.1 G/DL (ref 12–16)
LYMPHOCYTES # BLD: 1.6 K/UL (ref 1–4)
LYMPHOCYTES # BLD: 1.9 K/UL (ref 1–4)
LYMPHOCYTES NFR BLD: 34 %
LYMPHOCYTES NFR BLD: 34 %
MCH RBC QN AUTO: 31.7 PG (ref 27–32)
MCH RBC QN AUTO: 32 PG (ref 27–32)
MCHC RBC AUTO-ENTMCNC: 33 G/DL (ref 32–37)
MCHC RBC AUTO-ENTMCNC: 33.3 G/DL (ref 32–37)
MCV RBC AUTO: 95.3 FL (ref 80–100)
MCV RBC AUTO: 96.8 FL (ref 80–100)
MONOCYTES # BLD: 0.5 K/UL (ref 0–1)
MONOCYTES # BLD: 0.5 K/UL (ref 0–1)
MONOCYTES NFR BLD: 10 %
MONOCYTES NFR BLD: 10 %
NEUTROPHILS # BLD AUTO: 2.6 K/UL (ref 1.8–7.7)
NEUTROPHILS # BLD AUTO: 3.1 K/UL (ref 1.8–7.7)
NEUTROPHILS NFR BLD: 55 %
NEUTROPHILS NFR BLD: 56 %
OSMOLALITY UR CALC.SUM OF ELEC: 313 MOSM/KG (ref 275–295)
PLATELET # BLD AUTO: 170 K/UL (ref 140–400)
PLATELET # BLD AUTO: 184 K/UL (ref 140–400)
PMV BLD AUTO: 7.5 FL (ref 7.4–10.3)
PMV BLD AUTO: 7.5 FL (ref 7.4–10.3)
POTASSIUM SERPL-SCNC: 3.8 MMOL/L (ref 3.3–5.1)
RBC # BLD AUTO: 2.13 M/UL (ref 3.7–5.4)
RBC # BLD AUTO: 2.56 M/UL (ref 3.7–5.4)
SODIUM SERPL-SCNC: 142 MMOL/L (ref 136–144)
WBC # BLD AUTO: 4.8 K/UL (ref 4–11)
WBC # BLD AUTO: 5.5 K/UL (ref 4–11)

## 2018-03-08 PROCEDURE — 99233 SBSQ HOSP IP/OBS HIGH 50: CPT | Performed by: HOSPITALIST

## 2018-03-08 RX ORDER — AMIODARONE HYDROCHLORIDE 200 MG/1
200 TABLET ORAL DAILY
Status: DISCONTINUED | OUTPATIENT
Start: 2018-03-08 | End: 2018-03-10

## 2018-03-08 RX ORDER — ATORVASTATIN CALCIUM 20 MG/1
20 TABLET, FILM COATED ORAL NIGHTLY
Status: DISCONTINUED | OUTPATIENT
Start: 2018-03-08 | End: 2018-03-10

## 2018-03-08 RX ORDER — POTASSIUM CHLORIDE 20 MEQ/1
40 TABLET, EXTENDED RELEASE ORAL ONCE
Status: COMPLETED | OUTPATIENT
Start: 2018-03-08 | End: 2018-03-08

## 2018-03-08 RX ORDER — MELATONIN
325 2 TIMES DAILY WITH MEALS
Status: DISCONTINUED | OUTPATIENT
Start: 2018-03-08 | End: 2018-03-08

## 2018-03-08 RX ORDER — VITS A,C,E/LUTEIN/MINERALS 300MCG-200
1 TABLET ORAL DAILY
Status: DISCONTINUED | OUTPATIENT
Start: 2018-03-08 | End: 2018-03-10

## 2018-03-08 RX ORDER — GABAPENTIN 300 MG/1
300 CAPSULE ORAL 2 TIMES DAILY
Status: DISCONTINUED | OUTPATIENT
Start: 2018-03-08 | End: 2018-03-10

## 2018-03-08 RX ORDER — 0.9 % SODIUM CHLORIDE 0.9 %
VIAL (ML) INJECTION
Status: COMPLETED
Start: 2018-03-08 | End: 2018-03-08

## 2018-03-08 RX ORDER — DOCUSATE SODIUM 100 MG/1
100 CAPSULE, LIQUID FILLED ORAL DAILY
Status: DISCONTINUED | OUTPATIENT
Start: 2018-03-08 | End: 2018-03-10

## 2018-03-08 NOTE — TELEPHONE ENCOUNTER
Dr Neeraj Proter. Patient is in [place] [address]. Patient's daughter, Reta Bumpers, will be visiting her mom this afternoon around 3pm and stay until you can pop in. Family is aware you are seeing patients in the office all afternoon.

## 2018-03-08 NOTE — TELEPHONE ENCOUNTER
Spoke with Scooby Hernandez (patient's Idamae Loveless) who is home taking care of patient's  and will not be visiting today. However, other 2 sisters will be visiting patient this afternoon or evening after work.  Scooby Hernandez will call back and let us know when they will be

## 2018-03-08 NOTE — PROGRESS NOTES
[facility] [place] [facility]    Progress Note    Deborra Deter Loss Patient Status:  Inpatient    1930 MRN [id number]   Location [facility] 4W/SW/SE Attending Kenny Solitario MD   [facility] Day # 1 PCP Alex Benavides       Subjective:   Deborra Deter Loss is a

## 2018-03-08 NOTE — TELEPHONE ENCOUNTER
Reviewed CGM download which overall does not look too bad. Her sugar is normal this AM in the hospital - will her daughters be visiting today? If so what time and I might be able to stop by the hospital room.

## 2018-03-08 NOTE — TELEPHONE ENCOUNTER
Per liat, on 3.7.18, patient optd to cancel 3.9.18 appt - PSR LMTCB and reschedule appt - sd 3.8.18 @ 9:33AM

## 2018-03-08 NOTE — PROGRESS NOTES
[facility] - [facility]    Progress Note    Humera Christiano Loss Patient Status:  Inpatient    1930 MRN [id number]   Location [facility] 4W/SW/SE Attending Allen Ragland MD   Hosp Day # 1 PCP Gertrudis Preston       Subjective:     Pt has been feel Full    >35 minutes spent     Shea Stoll MD  3/8/2018

## 2018-03-08 NOTE — H&P
[facility]    PATIENT'S NAME: LING VILLASEÑOR   ATTENDING PHYSICIAN: Mike Novoa MD   PATIENT ACCOUNT#:   [de-identified]    LOCATION:  [place] [address]  MEDICAL RECORD #:   [id number]       YOB: 1930  ADMISSION DATE:       03/07/2018 family and usually independent for basic activities of daily living. REVIEW OF SYSTEMS:  The patient describes fatigue and decreased energy with dyspnea on exertion for the last week.   She has been seeing dark bowel movements, but this has been an issue 69:00:29  [facility] [id number]  /

## 2018-03-09 ENCOUNTER — TELEPHONE (OUTPATIENT)
Dept: ENDOCRINOLOGY CLINIC | Facility: CLINIC | Age: 83
End: 2018-03-09

## 2018-03-09 ENCOUNTER — ANESTHESIA (OUTPATIENT)
Dept: ENDOSCOPY | Facility: HOSPITAL | Age: 83
DRG: 378 | End: 2018-03-09
Payer: MEDICARE

## 2018-03-09 ENCOUNTER — ANESTHESIA EVENT (OUTPATIENT)
Dept: ENDOSCOPY | Facility: HOSPITAL | Age: 83
DRG: 378 | End: 2018-03-09
Payer: MEDICARE

## 2018-03-09 ENCOUNTER — SURGERY (OUTPATIENT)
Age: 83
End: 2018-03-09

## 2018-03-09 DIAGNOSIS — IMO0001 UNCONTROLLED TYPE 2 DIABETES MELLITUS WITHOUT COMPLICATION, WITH LONG-TERM CURRENT USE OF INSULIN: Primary | ICD-10-CM

## 2018-03-09 LAB
ANION GAP SERPL CALC-SCNC: 4 MMOL/L (ref 0–18)
BASOPHILS # BLD: 0 K/UL (ref 0–0.2)
BASOPHILS NFR BLD: 1 %
BLOOD TYPE BARCODE: 5100
BUN SERPL-MCNC: 39 MG/DL (ref 8–20)
BUN/CREAT SERPL: 28.3 (ref 10–20)
CALCIUM SERPL-MCNC: 8.8 MG/DL (ref 8.5–10.5)
CHLORIDE SERPL-SCNC: 113 MMOL/L (ref 95–110)
CO2 SERPL-SCNC: 27 MMOL/L (ref 22–32)
CREAT SERPL-MCNC: 1.38 MG/DL (ref 0.5–1.5)
EOSINOPHIL # BLD: 0.1 K/UL (ref 0–0.7)
EOSINOPHIL NFR BLD: 2 %
ERYTHROCYTE [DISTWIDTH] IN BLOOD BY AUTOMATED COUNT: 18.1 % (ref 11–15)
GLUCOSE BLDC GLUCOMTR-MCNC: 126 MG/DL (ref 70–99)
GLUCOSE BLDC GLUCOMTR-MCNC: 156 MG/DL (ref 70–99)
GLUCOSE BLDC GLUCOMTR-MCNC: 160 MG/DL (ref 70–99)
GLUCOSE BLDC GLUCOMTR-MCNC: 196 MG/DL (ref 70–99)
GLUCOSE SERPL-MCNC: 119 MG/DL (ref 70–99)
HCT VFR BLD AUTO: 23.5 % (ref 35–48)
HGB BLD-MCNC: 7.7 G/DL (ref 12–16)
LYMPHOCYTES # BLD: 1 K/UL (ref 1–4)
LYMPHOCYTES NFR BLD: 32 %
MCH RBC QN AUTO: 31.7 PG (ref 27–32)
MCHC RBC AUTO-ENTMCNC: 32.9 G/DL (ref 32–37)
MCV RBC AUTO: 96.3 FL (ref 80–100)
MONOCYTES # BLD: 0.4 K/UL (ref 0–1)
MONOCYTES NFR BLD: 14 %
NEUTROPHILS # BLD AUTO: 1.6 K/UL (ref 1.8–7.7)
NEUTROPHILS NFR BLD: 51 %
OSMOLALITY UR CALC.SUM OF ELEC: 309 MOSM/KG (ref 275–295)
PLATELET # BLD AUTO: 156 K/UL (ref 140–400)
PMV BLD AUTO: 7.3 FL (ref 7.4–10.3)
POTASSIUM SERPL-SCNC: 4.8 MMOL/L (ref 3.3–5.1)
POTASSIUM SERPL-SCNC: 4.8 MMOL/L (ref 3.3–5.1)
RBC # BLD AUTO: 2.44 M/UL (ref 3.7–5.4)
SODIUM SERPL-SCNC: 144 MMOL/L (ref 136–144)
WBC # BLD AUTO: 3.1 K/UL (ref 4–11)

## 2018-03-09 PROCEDURE — 99233 SBSQ HOSP IP/OBS HIGH 50: CPT | Performed by: HOSPITALIST

## 2018-03-09 PROCEDURE — 0W3P8ZZ CONTROL BLEEDING IN GASTROINTESTINAL TRACT, VIA NATURAL OR ARTIFICIAL OPENING ENDOSCOPIC: ICD-10-PCS | Performed by: INTERNAL MEDICINE

## 2018-03-09 PROCEDURE — 95251 CONT GLUC MNTR ANALYSIS I&R: CPT | Performed by: INTERNAL MEDICINE

## 2018-03-09 RX ORDER — SODIUM CHLORIDE 0.9 % (FLUSH) 0.9 %
10 SYRINGE (ML) INJECTION AS NEEDED
Status: DISCONTINUED | OUTPATIENT
Start: 2018-03-09 | End: 2018-03-10

## 2018-03-09 RX ORDER — SODIUM CHLORIDE 9 MG/ML
INJECTION, SOLUTION INTRAVENOUS
Status: COMPLETED
Start: 2018-03-09 | End: 2018-03-09

## 2018-03-09 RX ORDER — LIDOCAINE HYDROCHLORIDE 10 MG/ML
INJECTION, SOLUTION EPIDURAL; INFILTRATION; INTRACAUDAL; PERINEURAL AS NEEDED
Status: DISCONTINUED | OUTPATIENT
Start: 2018-03-09 | End: 2018-03-09 | Stop reason: SURG

## 2018-03-09 RX ORDER — DEXTROSE MONOHYDRATE 25 G/50ML
50 INJECTION, SOLUTION INTRAVENOUS
Status: DISCONTINUED | OUTPATIENT
Start: 2018-03-09 | End: 2018-03-09 | Stop reason: HOSPADM

## 2018-03-09 RX ORDER — SODIUM CHLORIDE 9 MG/ML
INJECTION, SOLUTION INTRAVENOUS ONCE
Status: DISCONTINUED | OUTPATIENT
Start: 2018-03-09 | End: 2018-03-09

## 2018-03-09 RX ORDER — ACETAMINOPHEN 325 MG/1
650 TABLET ORAL ONCE
Status: DISCONTINUED | OUTPATIENT
Start: 2018-03-09 | End: 2018-03-10

## 2018-03-09 RX ORDER — SODIUM CHLORIDE, SODIUM LACTATE, POTASSIUM CHLORIDE, CALCIUM CHLORIDE 600; 310; 30; 20 MG/100ML; MG/100ML; MG/100ML; MG/100ML
INJECTION, SOLUTION INTRAVENOUS CONTINUOUS PRN
Status: DISCONTINUED | OUTPATIENT
Start: 2018-03-09 | End: 2018-03-09 | Stop reason: SURG

## 2018-03-09 RX ORDER — SODIUM CHLORIDE, SODIUM LACTATE, POTASSIUM CHLORIDE, CALCIUM CHLORIDE 600; 310; 30; 20 MG/100ML; MG/100ML; MG/100ML; MG/100ML
INJECTION, SOLUTION INTRAVENOUS CONTINUOUS
Status: DISCONTINUED | OUTPATIENT
Start: 2018-03-09 | End: 2018-03-09

## 2018-03-09 RX ORDER — NALOXONE HYDROCHLORIDE 0.4 MG/ML
80 INJECTION, SOLUTION INTRAMUSCULAR; INTRAVENOUS; SUBCUTANEOUS AS NEEDED
Status: DISCONTINUED | OUTPATIENT
Start: 2018-03-09 | End: 2018-03-09 | Stop reason: HOSPADM

## 2018-03-09 RX ADMIN — SODIUM CHLORIDE, SODIUM LACTATE, POTASSIUM CHLORIDE, CALCIUM CHLORIDE: 600; 310; 30; 20 INJECTION, SOLUTION INTRAVENOUS at 13:39:00

## 2018-03-09 RX ADMIN — LIDOCAINE HYDROCHLORIDE 50 MG: 10 INJECTION, SOLUTION EPIDURAL; INFILTRATION; INTRACAUDAL; PERINEURAL at 13:39:00

## 2018-03-09 RX ADMIN — SODIUM CHLORIDE, SODIUM LACTATE, POTASSIUM CHLORIDE, CALCIUM CHLORIDE: 600; 310; 30; 20 INJECTION, SOLUTION INTRAVENOUS at 13:55:00

## 2018-03-09 NOTE — PLAN OF CARE
HEMATOLOGIC - ADULT    • Free from bleeding injury Progressing        PAIN - ADULT    • Verbalizes/displays adequate comfort level or patient's stated pain goal Progressing        Patient/Family Goals    • Patient/Family Long Term Goal Progressing    • Maurice Ba

## 2018-03-09 NOTE — OPERATIVE REPORT
ESOPHAGOGASTRODUODENOSCOPY REPORT    Patient Name:  JOSEPH Zepeda 46 Record #: [id number]  YOB: 1930  Date of Procedure: 3/9/2018    Referring physician: Nikki Farrar     EGD with APC    Surgeon:  Kenan Joseph MD    Pre-op diagn

## 2018-03-09 NOTE — PLAN OF CARE
GASTROINTESTINAL - ADULT    • Minimal or absence of nausea and vomiting Progressing    • Maintains or returns to baseline bowel function Progressing        HEMATOLOGIC - ADULT    • Free from bleeding injury Progressing        PAIN - ADULT    • Verbalizes/d

## 2018-03-09 NOTE — TELEPHONE ENCOUNTER
Visited patient during hospitalization to discuss results of [facility]. Overall BG levels were adequately controlled on recent evaluation of CGM - calculated A1c was 8.3%.   She does have significant hyperglycemia after breakfast due to high fiber and

## 2018-03-09 NOTE — H&P
PRE-PROCEDURE UPDATE    HPI: Michelle Zepeda is a 80year old female. 2/27/1930. Patient presents for an Esophagogastroduodenoscopy.     ALLERGIES:   Codeine                     Comment:Hallucinating, vomiting  Diazepam                    Comment:vomiting

## 2018-03-09 NOTE — ANESTHESIA POSTPROCEDURE EVALUATION
Patient: Jimena Lam Loss    Procedure Summary     Date:  03/09/18 Room / Location:  [address] [place] [address] ENDOSCOPY 01 / [address] [place] [address] ENDOSCOPY    Anesthesia Start:  5692 Anesthesia Stop:      Procedure:  ESOPHAGOGASTRODUODENOSCOPY (EGD) (N/A ) Diagnosis:  (avm duodenal)    Surgeon:

## 2018-03-09 NOTE — PROGRESS NOTES
[facility]    Progress Note    Verla Fuse Loss Patient Status:  Inpatient    1930 MRN [id number]   Location [facility] 4W/SW/SE Attending Mannie Rutledge,  [facility] Day # 2 PCP Som Gipson       Subjective:   Verla Fuse Loss is a

## 2018-03-09 NOTE — PROGRESS NOTES
[facility]    Progress Note    Grant Noss Loss Patient Status:  Inpatient    1930 MRN [id number]   Location [facility] 4W/SW/SE Attending Alicia Pan, [facility] Day # 2 PCP [facility] [place]       Subjective:     No CP or SOB. xarelto     dvt proph:   SCDs     Code status:   Full     >35 minutes spent     Shea Stoll MD  3/9/2018

## 2018-03-09 NOTE — ANESTHESIA PREPROCEDURE EVALUATION
Anesthesia PreOp Note    HPI:     Maribeth Ramirez is a 80year old female who presents for preoperative consultation requested by: Patience Forbes MD    Date of Surgery: 3/7/2018 - 3/9/2018    Procedure(s):  ESOPHAGOGASTRODUODENOSCOPY (EGD)  Indication: u Take 1 tablet (200 mg total) by mouth daily. Disp: 30 tablet Rfl: 1 3/7/2018 at Unknown time   Cyanocobalamin (VITAMIN B-12) 2500 MCG Sublingual SL Tab Place 2,500 mcg under the tongue daily.  Disp: 30 tablet Rfl: 1 3/7/2018 at Unknown time   cholecalcifero 160-200 mg/dL Give 2 units for blood glucose 201-240 mg/dL Give 3 units for blood glucose 241-280 mg/dL Give 4 units for blood glucose 281-320 mg/dL Give 5 units for blood glucose 321-360 mg/dL Call primary physician if blood glucose is greater than 360 mg Melissa Griffin MD    0.9%  NaCl infusion  Intravenous Continuous Brooke Sow MD Last Rate: 83 mL/hr at 03/09/18 1011   dextrose 50% injection 50 mL 50 mL Intravenous PRN Brooke Sow MD    Glucose-Vitamin C (DEX-4) 4-0.006 g chewable tab 4 tablet 4 tablet 03/05/2018   K 4.8 03/09/2018   K 4.8 03/09/2018   K 5.1 03/05/2018    (H) 03/09/2018    03/05/2018   CO2 27 03/09/2018   CO2 25.0 03/05/2018   BUN 39 (H) 03/09/2018   BUN 82 (H) 03/05/2018   CREATSERUM 1.38 03/09/2018   CREATSERUM 1.75 (H) 03/

## 2018-03-10 VITALS
SYSTOLIC BLOOD PRESSURE: 102 MMHG | OXYGEN SATURATION: 96 % | HEIGHT: 62 IN | TEMPERATURE: 98 F | BODY MASS INDEX: 36.8 KG/M2 | HEART RATE: 49 BPM | WEIGHT: 200 LBS | RESPIRATION RATE: 18 BRPM | DIASTOLIC BLOOD PRESSURE: 38 MMHG

## 2018-03-10 LAB
ANION GAP SERPL CALC-SCNC: 4 MMOL/L (ref 0–18)
BASOPHILS # BLD: 0 K/UL (ref 0–0.2)
BASOPHILS NFR BLD: 1 %
BLOOD TYPE BARCODE: 5100
BUN SERPL-MCNC: 27 MG/DL (ref 8–20)
BUN/CREAT SERPL: 22.5 (ref 10–20)
CALCIUM SERPL-MCNC: 8.7 MG/DL (ref 8.5–10.5)
CHLORIDE SERPL-SCNC: 109 MMOL/L (ref 95–110)
CO2 SERPL-SCNC: 26 MMOL/L (ref 22–32)
CREAT SERPL-MCNC: 1.2 MG/DL (ref 0.5–1.5)
EOSINOPHIL # BLD: 0.1 K/UL (ref 0–0.7)
EOSINOPHIL NFR BLD: 2 %
ERYTHROCYTE [DISTWIDTH] IN BLOOD BY AUTOMATED COUNT: 17.9 % (ref 11–15)
GLUCOSE BLDC GLUCOMTR-MCNC: 127 MG/DL (ref 70–99)
GLUCOSE BLDC GLUCOMTR-MCNC: 201 MG/DL (ref 70–99)
GLUCOSE BLDC GLUCOMTR-MCNC: 272 MG/DL (ref 70–99)
GLUCOSE SERPL-MCNC: 126 MG/DL (ref 70–99)
HCT VFR BLD AUTO: 28.2 % (ref 35–48)
HGB BLD-MCNC: 9.2 G/DL (ref 12–16)
LYMPHOCYTES # BLD: 1.1 K/UL (ref 1–4)
LYMPHOCYTES NFR BLD: 24 %
MCH RBC QN AUTO: 30.9 PG (ref 27–32)
MCHC RBC AUTO-ENTMCNC: 32.6 G/DL (ref 32–37)
MCV RBC AUTO: 94.9 FL (ref 80–100)
MONOCYTES # BLD: 0.5 K/UL (ref 0–1)
MONOCYTES NFR BLD: 12 %
NEUTROPHILS # BLD AUTO: 2.8 K/UL (ref 1.8–7.7)
NEUTROPHILS NFR BLD: 62 %
OSMOLALITY UR CALC.SUM OF ELEC: 295 MOSM/KG (ref 275–295)
PLATELET # BLD AUTO: 143 K/UL (ref 140–400)
PMV BLD AUTO: 7.4 FL (ref 7.4–10.3)
POTASSIUM SERPL-SCNC: 4.6 MMOL/L (ref 3.3–5.1)
RBC # BLD AUTO: 2.97 M/UL (ref 3.7–5.4)
SODIUM SERPL-SCNC: 139 MMOL/L (ref 136–144)
WBC # BLD AUTO: 4.6 K/UL (ref 4–11)

## 2018-03-10 PROCEDURE — 99239 HOSP IP/OBS DSCHRG MGMT >30: CPT | Performed by: HOSPITALIST

## 2018-03-10 RX ORDER — PANTOPRAZOLE SODIUM 40 MG/1
40 TABLET, DELAYED RELEASE ORAL
Status: DISCONTINUED | OUTPATIENT
Start: 2018-03-11 | End: 2018-03-10

## 2018-03-10 NOTE — CM/SW NOTE
3/10/18 CM Discharge planning   Pt resides with , family home and is current with [facility]. Resume HHC orders on chart, advised [facility], anticipate d/c home later today.    Rosette Zepeda X [id number]

## 2018-03-10 NOTE — PROGRESS NOTES
[facility] - [facility]    Progress Note    Bernice Weinberg Loss Patient Status:  Inpatient    1930 MRN [id number]   Location [facility] 4W/SW/SE Attending Nataly Marino MD   Hosp Day # 3 PCP Danish Ruiz       Subjective:     Per tele, pt has xarelto.    Cards consult for anticoag as above     dvt proph:   SCDs     Code status:   Full     >35 minutes spent     Shea Stoll MD  3/10/2018

## 2018-03-10 NOTE — HISTORICAL OFFICE NOTE
LING CHARLEEN  : 1930  ACCOUNT:  [id number]  [phone]  PCP: Dr. Zachery Srivastava     TODAY'S DATE: 02/15/2018  DICTATED BY:  [Dr. Lucas Coleman: [Followup of .  CAD, of native vessels and Followup of Paroxysmal atrial fibrillati and diet, exercise program prescribed. EYES: PERRLA. ENT: mucosa pink and moist. NECK: jugular venous pressure not elevated. RESP: clear to auscultation and percussion. GI: no masses and no hepatosplenomegaly.  LYMPHATIC:  no lymphedema and nodes non-palpab 02/15/18 Glimepiride           4MG       1 po daily                               02/15/18 Monie Krystal FlexTouch     100UNIT/  daily as directed                        01/11/18 Gabapentin            100MG     1 po twice daily

## 2018-03-10 NOTE — CONSULTS
[facility]  Report of Consultation    Christine Pellet Loss Patient Status:  Inpatient    1930 MRN [id number]   Location [facility] 4W/SW/SE Attending Rupa Aiken MD   Hosp Day # 3 PCP [facility] PONV (postoperative nausea and vomiting)      Past Surgical History:  1/8/2018: COLONOSCOPY N/A      Comment: Procedure: COLONOSCOPY;  Surgeon: Mari Mao MD;  Location: [facility] ENDOSCOPY  01/2018: EGD  Family History   Problem Relation Age (36.5 °C), temperature source Oral, resp. rate 18, height 157.5 cm (5' 2\"), weight 200 lb (90.7 kg), SpO2 96 %.   Temp (24hrs), Av.9 °F (36.6 °C), Min:97.7 °F (36.5 °C), Max:98.3 °F (36.8 °C)    Wt Readings from Last 3 Encounters:  18 : 200 lb (9

## 2018-03-10 NOTE — HOME CARE LIAISON
PATIENT HAS BEEN FOLLOWED BY [facility] AND ORDERS RECEIVED TO CONTINUE SERVICES. MET WITH PATIENT, WHO VERBALIZED AGREEMENT WITH PLANS. PATIENT GIVEN [facility] CONTACT INFORMATION.

## 2018-03-11 NOTE — PLAN OF CARE
GASTROINTESTINAL - ADULT    • Minimal or absence of nausea and vomiting Adequate for Discharge    • Maintains or returns to baseline bowel function Adequate for Discharge        HEMATOLOGIC - ADULT    • Free from bleeding injury Adequate for Discharge

## 2018-03-11 NOTE — DISCHARGE SUMMARY
[facility]    Discharge Summary    Norvel Bumps Loss Patient Status:  Inpatient    1930 MRN [id number]   Location [facility] 4W/SW/SE Attending No att. providers found   Hosp Day # 3 PCP Nathan Lofty     Date of Admission: 3/7/2 March 5, which was 7.1. Today her family brought her in because of progressive dyspnea on exertion and fatigue. Blood transfusion was ordered. IV Protonix was given and she will be admitted to the hospital for further management.       Hospital Course: Favio Males  What changed:  · medication strength  · how much to take      Take 1 tablet (10 mg total) by mouth daily with food.    Quantity:  30 tablet  Refills:  1        CONTINUE taking these medications      Instructions Prescription details   allopurinol 1 Quantity:  10 mL  Refills:  0     MILK OF MAGNESIA 400 MG/5ML Susp  Generic drug:  magnesium hydroxide      Take 30 mL by mouth daily as needed for constipation. Refills:  0     OCUVITE EXTRA Tabs      Take 1 tablet by mouth daily.    Refills:  0     omep days      Hospital Discharge Diagnoses: Upper GI hemorrhage    Lace+ Score: 24  59-90 High Risk  29-58 Medium Risk  0-28   Low Risk. TCM Follow-Up Recommendation:  LACE > 58:  High Risk of readmission after discharge from the hospital.      >35 minutes s

## 2018-03-12 ENCOUNTER — PRIOR ORIGINAL RECORDS (OUTPATIENT)
Dept: OTHER | Age: 83
End: 2018-03-12

## 2018-03-12 RX ORDER — BLOOD SUGAR DIAGNOSTIC
STRIP MISCELLANEOUS
Qty: 100 STRIP | Refills: 3 | Status: SHIPPED | OUTPATIENT
Start: 2018-03-12 | End: 2018-04-06

## 2018-03-13 ENCOUNTER — TELEPHONE (OUTPATIENT)
Dept: ENDOCRINOLOGY CLINIC | Facility: CLINIC | Age: 83
End: 2018-03-13

## 2018-03-13 NOTE — TELEPHONE ENCOUNTER
[place] states pt has 3 meds that was given at hospital and requesting to speak with RN to verify meds. Pls call. Thank you.

## 2018-03-13 NOTE — TELEPHONE ENCOUNTER
Arnulfo Barron wanted to review which medications  has patient on for DM. REviewed most recent note (TE from 3/9) Taking Glimepiride 4mg PO daily, Tresiba 20 units daily and Januvia. Arnulfo Barron will return call if anything additional needed.

## 2018-03-15 ENCOUNTER — LAB REQUISITION (OUTPATIENT)
Dept: LAB | Facility: HOSPITAL | Age: 83
End: 2018-03-15
Payer: MEDICARE

## 2018-03-15 ENCOUNTER — PRIOR ORIGINAL RECORDS (OUTPATIENT)
Dept: OTHER | Age: 83
End: 2018-03-15

## 2018-03-15 DIAGNOSIS — E11.9 TYPE 2 DIABETES MELLITUS WITHOUT COMPLICATIONS (HCC): ICD-10-CM

## 2018-03-15 DIAGNOSIS — D64.9 ANEMIA: ICD-10-CM

## 2018-03-15 LAB
BASOPHILS # BLD AUTO: 0.02 X10(3) UL (ref 0–0.1)
BASOPHILS NFR BLD AUTO: 0.5 %
EOSINOPHIL # BLD AUTO: 0.07 X10(3) UL (ref 0–0.3)
EOSINOPHIL NFR BLD AUTO: 1.7 %
ERYTHROCYTE [DISTWIDTH] IN BLOOD BY AUTOMATED COUNT: 15.5 % (ref 11.5–16)
HCT VFR BLD AUTO: 32.9 % (ref 34–50)
HGB BLD-MCNC: 10.2 G/DL (ref 12–16)
IMMATURE GRANULOCYTE COUNT: 0.01 X10(3) UL (ref 0–1)
IMMATURE GRANULOCYTE RATIO %: 0.2 %
LYMPHOCYTES # BLD AUTO: 1.55 X10(3) UL (ref 0.9–4)
LYMPHOCYTES NFR BLD AUTO: 38.2 %
MCH RBC QN AUTO: 30.3 PG (ref 27–33.2)
MCHC RBC AUTO-ENTMCNC: 31 G/DL (ref 31–37)
MCV RBC AUTO: 97.6 FL (ref 81–100)
MONOCYTES # BLD AUTO: 0.52 X10(3) UL (ref 0.1–1)
MONOCYTES NFR BLD AUTO: 12.8 %
NEUTROPHIL ABS PRELIM: 1.89 X10 (3) UL (ref 1.3–6.7)
NEUTROPHILS # BLD AUTO: 1.89 X10(3) UL (ref 1.3–6.7)
NEUTROPHILS NFR BLD AUTO: 46.6 %
PLATELET # BLD AUTO: 166 10(3)UL (ref 150–450)
RBC # BLD AUTO: 3.37 X10(6)UL (ref 3.8–5.1)
RED CELL DISTRIBUTION WIDTH-SD: 55.7 FL (ref 35.1–46.3)
WBC # BLD AUTO: 4.1 X10(3) UL (ref 4–13)

## 2018-03-15 PROCEDURE — 85025 COMPLETE CBC W/AUTO DIFF WBC: CPT | Performed by: INTERNAL MEDICINE

## 2018-03-16 ENCOUNTER — PRIOR ORIGINAL RECORDS (OUTPATIENT)
Dept: OTHER | Age: 83
End: 2018-03-16

## 2018-03-16 LAB
HEMATOCRIT: 32.9 %
HEMOGLOBIN: 10.2 G/DL
PLATELETS: 166 K/UL
RED BLOOD COUNT: 3.37 X 10-6/U
WHITE BLOOD COUNT: 4.1 X 10-3/U

## 2018-03-26 ENCOUNTER — PRIOR ORIGINAL RECORDS (OUTPATIENT)
Dept: OTHER | Age: 83
End: 2018-03-26

## 2018-03-29 ENCOUNTER — PRIOR ORIGINAL RECORDS (OUTPATIENT)
Dept: OTHER | Age: 83
End: 2018-03-29

## 2018-04-04 ENCOUNTER — OFFICE VISIT (OUTPATIENT)
Dept: NEUROLOGY | Facility: CLINIC | Age: 83
End: 2018-04-04

## 2018-04-04 ENCOUNTER — PRIOR ORIGINAL RECORDS (OUTPATIENT)
Dept: OTHER | Age: 83
End: 2018-04-04

## 2018-04-04 ENCOUNTER — LAB REQUISITION (OUTPATIENT)
Dept: LAB | Facility: HOSPITAL | Age: 83
End: 2018-04-04
Attending: INTERNAL MEDICINE
Payer: MEDICARE

## 2018-04-04 VITALS
SYSTOLIC BLOOD PRESSURE: 112 MMHG | WEIGHT: 199.63 LBS | BODY MASS INDEX: 36.74 KG/M2 | DIASTOLIC BLOOD PRESSURE: 52 MMHG | RESPIRATION RATE: 16 BRPM | HEIGHT: 62 IN | HEART RATE: 83 BPM

## 2018-04-04 DIAGNOSIS — D64.9 ANEMIA: ICD-10-CM

## 2018-04-04 DIAGNOSIS — E11.42 DIABETIC POLYNEUROPATHY ASSOCIATED WITH TYPE 2 DIABETES MELLITUS (HCC): ICD-10-CM

## 2018-04-04 DIAGNOSIS — N28.9 DISORDER OF KIDNEY AND URETER: ICD-10-CM

## 2018-04-04 DIAGNOSIS — R25.1 TREMOR: Primary | ICD-10-CM

## 2018-04-04 PROCEDURE — 80053 COMPREHEN METABOLIC PANEL: CPT | Performed by: INTERNAL MEDICINE

## 2018-04-04 PROCEDURE — 85025 COMPLETE CBC W/AUTO DIFF WBC: CPT | Performed by: INTERNAL MEDICINE

## 2018-04-04 PROCEDURE — 99204 OFFICE O/P NEW MOD 45 MIN: CPT | Performed by: OTHER

## 2018-04-04 RX ORDER — GABAPENTIN 300 MG/1
300 CAPSULE ORAL 3 TIMES DAILY
COMMUNITY
Start: 2018-01-16 | End: 2018-07-18

## 2018-04-04 NOTE — PATIENT INSTRUCTIONS
Preventing Falls: Making Changes in [place] 1947  Is your living space filled with hazards that could cause you to fall? Changes can make you safer. They could even save your life. Take a careful look around your home.  Change what you can on your time. The bathroom is a common spot for falls, so you may start there. Or start with a room you spend lots of time in, such as your bedroom. Make only a few changes at once.  This will give you time to adjust to them.     Outside your home  You might arrang includes questions about sensations of pain, tingling, pins and needles, or numbness.  Your healthcare provider will also want to know if you have high blood pressure and heart disease, or if you smoke. Be sure to mention any medicines (including over-the-c more about caring for your feet at home. For example, you may be told to avoid walking barefoot. Or you may be told that special footwear is needed to protect your feet. Have regular checkups  Foot problems can develop quickly.  So be sure to follow your h

## 2018-04-04 NOTE — PROGRESS NOTES
Neurology Outpatient initial note    Laura Zepeda : 1930   HPI:     Laura Zepeda is a 80year old female who is being seen in neurologic evaluation. Patient is being seen in evaluation for tremors, as well as numbness and tingling in her feet. mouth every morning before breakfast. Disp: 90 tablet Rfl: 0   Insulin Degludec (TRESIBA FLEXTOUCH) 100 UNIT/ML Subcutaneous Solution Pen-injector Inject 14 Units into the skin daily.  Disp:  Rfl:    atorvastatin 20 MG Oral Tab Take 1 tablet (20 mg total) b glucose 160-200 mg/dL Give 2 units for blood glucose 201-240 mg/dL Give 3 units for blood glucose 241-280 mg/dL Give 4 units for blood glucose 281-320 mg/dL Give 5 units for blood glucose 321-360 mg/dL Call primary physician if blood glucose is greater elle History Narrative    The patient uses the following assistive device(s):  quad cane. The patient does not live in a home with stairs.               ROS:   GENERAL: no fevers, no chills  SKIN: Hyperpigmentation of lower extremities  EYES: no vision loss alignment is near-anatomic. 3. Multilevel cervical spondylosis and mild dextroscoliosis. Noncontrast head CT 2016  CONCLUSION:  1.  Negative for depressed calvarial fracture, coup/contrecoup     intraparenchymal contusion, intracranial hemorrhage, or

## 2018-04-05 ENCOUNTER — PRIOR ORIGINAL RECORDS (OUTPATIENT)
Dept: OTHER | Age: 83
End: 2018-04-05

## 2018-04-06 RX ORDER — BLOOD SUGAR DIAGNOSTIC
STRIP MISCELLANEOUS
Qty: 100 STRIP | Refills: 3 | Status: SHIPPED | OUTPATIENT
Start: 2018-04-06 | End: 2018-06-05

## 2018-04-06 NOTE — TELEPHONE ENCOUNTER
Current Outpatient Prescriptions:  Glucose Blood (ONETOUCH VERIO) In Vitro Strip Use to check sugars 2 times daily Disp: 100 strip Rfl: 3     Refill 90 day

## 2018-04-11 LAB
ALBUMIN: 3.6 G/DL
ALKALINE PHOSPHATATE(ALK PHOS): 96 IU/L
BILIRUBIN TOTAL: 0.4 MG/DL
BUN: 38 MG/DL
CALCIUM: 9.3 MG/DL
CHLORIDE: 101 MEQ/L
CREATININE, SERUM: 1.59 MG/DL
GLUCOSE: 242 MG/DL
HEMATOCRIT: 40 %
HEMOGLOBIN: 12.4 G/DL
PLATELETS: 162 K/UL
POTASSIUM, SERUM: 3.8 MEQ/L
PROTEIN, TOTAL: 6.9 G/DL
RED BLOOD COUNT: 4.12 X 10-6/U
SGOT (AST): 15 IU/L
SGPT (ALT): 21 IU/L
SODIUM: 140 MEQ/L
WHITE BLOOD COUNT: 4.8 X 10-3/U

## 2018-04-18 ENCOUNTER — OFFICE VISIT (OUTPATIENT)
Dept: ENDOCRINOLOGY CLINIC | Facility: CLINIC | Age: 83
End: 2018-04-18

## 2018-04-18 VITALS
WEIGHT: 201 LBS | SYSTOLIC BLOOD PRESSURE: 99 MMHG | HEART RATE: 56 BPM | DIASTOLIC BLOOD PRESSURE: 51 MMHG | HEIGHT: 62 IN | BODY MASS INDEX: 36.99 KG/M2

## 2018-04-18 DIAGNOSIS — E11.65 CONTROLLED TYPE 2 DIABETES MELLITUS WITH HYPERGLYCEMIA, WITHOUT LONG-TERM CURRENT USE OF INSULIN (HCC): Primary | ICD-10-CM

## 2018-04-18 PROCEDURE — 36416 COLLJ CAPILLARY BLOOD SPEC: CPT | Performed by: INTERNAL MEDICINE

## 2018-04-18 PROCEDURE — 99214 OFFICE O/P EST MOD 30 MIN: CPT | Performed by: INTERNAL MEDICINE

## 2018-04-18 PROCEDURE — 82962 GLUCOSE BLOOD TEST: CPT | Performed by: INTERNAL MEDICINE

## 2018-04-18 NOTE — PATIENT INSTRUCTIONS
Change Tresiba if sugar under 160 take 12 units; if sugar above 160 then take 16 units at bedtime    Continue Glimepiride

## 2018-04-18 NOTE — PROGRESS NOTES
Name: Meagan Zepeda  Date: 4/18/2018    Referring Physician: No ref.  provider found    HISTORY OF PRESENT ILLNESS   Meagan Zepeda is a 80year old female who presents for     She was hospitalized in 11/2017 with severe RSV PNA requiring intubation and long daily with food. , Disp: 30 tablet, Rfl: 1  •  glimepiride 4 MG Oral Tab, Take 1 tablet (4 mg total) by mouth every morning before breakfast., Disp: 90 tablet, Rfl: 0  •  Insulin Degludec (TRESIBA FLEXTOUCH) 100 UNIT/ML Subcutaneous Solution Pen-injector, I Solution Pen-injector, Inject 14 Units into the skin every morning.  (Patient taking differently: Inject 20 Units into the skin every morning.  ), Disp: 6 mL, Rfl: 1  •  Insulin Lispro (HUMALOG) 100 UNIT/ML Subcutaneous Solution, Give 1 unit for blood gluco [facility])    • Coronary atherosclerosis    • Diabetes ([address]    • Essential hypertension    • High blood pressure    • PONV (postoperative nausea and vomiting)        Surgical history:   Past Surgical History:  1/8/2018: COLONOSCOPY N/A      Comment: Procedure: 350    This is a 25 minute visit and greater than 50% of the time was spent counseling the patient and/or coordinating care.     RTC 3 months     4/18/2018  Estela Barnes MD

## 2018-05-15 ENCOUNTER — LAB ENCOUNTER (OUTPATIENT)
Dept: LAB | Age: 83
End: 2018-05-15
Attending: INTERNAL MEDICINE
Payer: MEDICARE

## 2018-05-15 DIAGNOSIS — M89.9 DISORDER OF BONE: Primary | ICD-10-CM

## 2018-05-15 PROCEDURE — 36415 COLL VENOUS BLD VENIPUNCTURE: CPT

## 2018-05-15 PROCEDURE — 85025 COMPLETE CBC W/AUTO DIFF WBC: CPT

## 2018-05-21 RX ORDER — GLIMEPIRIDE 4 MG/1
TABLET ORAL
Qty: 90 TABLET | Refills: 0 | Status: SHIPPED | OUTPATIENT
Start: 2018-05-21 | End: 2018-06-26

## 2018-06-05 ENCOUNTER — TELEPHONE (OUTPATIENT)
Dept: ENDOCRINOLOGY CLINIC | Facility: CLINIC | Age: 83
End: 2018-06-05

## 2018-06-05 RX ORDER — BLOOD SUGAR DIAGNOSTIC
STRIP MISCELLANEOUS
Qty: 100 STRIP | Refills: 3 | Status: SHIPPED | OUTPATIENT
Start: 2018-06-05 | End: 2018-10-17

## 2018-06-05 NOTE — TELEPHONE ENCOUNTER
Current Outpatient Prescriptions:  Glucose Blood (ONETOUCH VERIO) In Vitro Strip Use to check sugars 2 times daily Disp: 100 strip Rfl: 3     Refill

## 2018-06-14 ENCOUNTER — PRIOR ORIGINAL RECORDS (OUTPATIENT)
Dept: OTHER | Age: 83
End: 2018-06-14

## 2018-06-26 ENCOUNTER — TELEPHONE (OUTPATIENT)
Dept: ENDOCRINOLOGY CLINIC | Facility: CLINIC | Age: 83
End: 2018-06-26

## 2018-06-26 RX ORDER — GLIMEPIRIDE 4 MG/1
TABLET ORAL
Qty: 90 TABLET | Refills: 1 | Status: SHIPPED | OUTPATIENT
Start: 2018-06-26 | End: 2019-02-01

## 2018-06-26 NOTE — TELEPHONE ENCOUNTER
Current Outpatient Prescriptions:  GLIMEPIRIDE 4 MG Oral Tab TAKE 1 TABLET EVERY MORNING BEFORE BREAKFAST Disp: 90 tablet Rfl: 0     Refill

## 2018-06-29 ENCOUNTER — LAB ENCOUNTER (OUTPATIENT)
Dept: LAB | Age: 83
End: 2018-06-29
Attending: INTERNAL MEDICINE
Payer: MEDICARE

## 2018-06-29 DIAGNOSIS — R06.02 SHORTNESS OF BREATH: Primary | ICD-10-CM

## 2018-06-29 LAB
ALBUMIN SERPL BCP-MCNC: 3.5 G/DL (ref 3.5–4.8)
ALBUMIN/GLOB SERPL: 1.6 {RATIO} (ref 1–2)
ALP SERPL-CCNC: 90 U/L (ref 32–100)
ALT SERPL-CCNC: 14 U/L (ref 14–54)
ANION GAP SERPL CALC-SCNC: 9 MMOL/L (ref 0–18)
AST SERPL-CCNC: 16 U/L (ref 15–41)
BASOPHILS # BLD: 0 K/UL (ref 0–0.2)
BASOPHILS NFR BLD: 1 %
BILIRUB SERPL-MCNC: 0.5 MG/DL (ref 0.3–1.2)
BUN SERPL-MCNC: 51 MG/DL (ref 8–20)
BUN/CREAT SERPL: 32.3 (ref 10–20)
CALCIUM SERPL-MCNC: 8.7 MG/DL (ref 8.5–10.5)
CHLORIDE SERPL-SCNC: 98 MMOL/L (ref 95–110)
CO2 SERPL-SCNC: 30 MMOL/L (ref 22–32)
CREAT SERPL-MCNC: 1.58 MG/DL (ref 0.5–1.5)
EOSINOPHIL # BLD: 0 K/UL (ref 0–0.7)
EOSINOPHIL NFR BLD: 1 %
ERYTHROCYTE [DISTWIDTH] IN BLOOD BY AUTOMATED COUNT: 15.2 % (ref 11–15)
GLOBULIN PLAS-MCNC: 2.2 G/DL (ref 2.5–3.7)
GLUCOSE SERPL-MCNC: 330 MG/DL (ref 70–99)
HCT VFR BLD AUTO: 39.4 % (ref 35–48)
HGB BLD-MCNC: 13 G/DL (ref 12–16)
LYMPHOCYTES # BLD: 1.7 K/UL (ref 1–4)
LYMPHOCYTES NFR BLD: 38 %
MCH RBC QN AUTO: 31 PG (ref 27–32)
MCHC RBC AUTO-ENTMCNC: 33 G/DL (ref 32–37)
MCV RBC AUTO: 94 FL (ref 80–100)
MONOCYTES # BLD: 0.6 K/UL (ref 0–1)
MONOCYTES NFR BLD: 13 %
NEUTROPHILS # BLD AUTO: 2.2 K/UL (ref 1.8–7.7)
NEUTROPHILS NFR BLD: 48 %
OSMOLALITY UR CALC.SUM OF ELEC: 311 MOSM/KG (ref 275–295)
PATIENT FASTING: NO
PLATELET # BLD AUTO: 120 K/UL (ref 140–400)
PMV BLD AUTO: 8.2 FL (ref 7.4–10.3)
POTASSIUM SERPL-SCNC: 4.2 MMOL/L (ref 3.3–5.1)
PROT SERPL-MCNC: 5.7 G/DL (ref 5.9–8.4)
RBC # BLD AUTO: 4.19 M/UL (ref 3.7–5.4)
SODIUM SERPL-SCNC: 137 MMOL/L (ref 136–144)
WBC # BLD AUTO: 4.6 K/UL (ref 4–11)

## 2018-06-29 PROCEDURE — 85025 COMPLETE CBC W/AUTO DIFF WBC: CPT

## 2018-06-29 PROCEDURE — 36415 COLL VENOUS BLD VENIPUNCTURE: CPT

## 2018-06-29 PROCEDURE — 80053 COMPREHEN METABOLIC PANEL: CPT

## 2018-07-18 ENCOUNTER — OFFICE VISIT (OUTPATIENT)
Dept: ENDOCRINOLOGY CLINIC | Facility: CLINIC | Age: 83
End: 2018-07-18
Payer: MEDICARE

## 2018-07-18 VITALS
DIASTOLIC BLOOD PRESSURE: 75 MMHG | HEIGHT: 62 IN | SYSTOLIC BLOOD PRESSURE: 132 MMHG | HEART RATE: 56 BPM | WEIGHT: 202 LBS | BODY MASS INDEX: 37.17 KG/M2

## 2018-07-18 DIAGNOSIS — Z79.4 TYPE 2 DIABETES MELLITUS WITH HYPERGLYCEMIA, WITH LONG-TERM CURRENT USE OF INSULIN (HCC): Primary | ICD-10-CM

## 2018-07-18 DIAGNOSIS — E11.65 TYPE 2 DIABETES MELLITUS WITH HYPERGLYCEMIA, WITH LONG-TERM CURRENT USE OF INSULIN (HCC): Primary | ICD-10-CM

## 2018-07-18 LAB
CARTRIDGE LOT#: ABNORMAL NUMERIC
GLUCOSE BLOOD: 314
HEMOGLOBIN A1C: 9.4 % (ref 4.3–5.6)
TEST STRIP LOT #: NORMAL NUMERIC

## 2018-07-18 PROCEDURE — 36416 COLLJ CAPILLARY BLOOD SPEC: CPT | Performed by: INTERNAL MEDICINE

## 2018-07-18 PROCEDURE — 83036 HEMOGLOBIN GLYCOSYLATED A1C: CPT | Performed by: INTERNAL MEDICINE

## 2018-07-18 PROCEDURE — 82962 GLUCOSE BLOOD TEST: CPT | Performed by: INTERNAL MEDICINE

## 2018-07-18 PROCEDURE — 99214 OFFICE O/P EST MOD 30 MIN: CPT | Performed by: INTERNAL MEDICINE

## 2018-07-18 RX ORDER — GABAPENTIN 300 MG/1
300 CAPSULE ORAL 3 TIMES DAILY
Qty: 360 CAPSULE | Refills: 1 | Status: SHIPPED | OUTPATIENT
Start: 2018-07-18 | End: 2018-09-17

## 2018-07-18 NOTE — PATIENT INSTRUCTIONS
Increase Tresiba to 14 units SQ if BG less than 160 and increase to 18 units if sugar above 160    Continue Glimepiride

## 2018-09-17 ENCOUNTER — PATIENT MESSAGE (OUTPATIENT)
Dept: ENDOCRINOLOGY CLINIC | Facility: CLINIC | Age: 83
End: 2018-09-17

## 2018-09-17 RX ORDER — GABAPENTIN 300 MG/1
300 CAPSULE ORAL 2 TIMES DAILY
Qty: 180 CAPSULE | Refills: 0 | Status: SHIPPED | OUTPATIENT
Start: 2018-09-17 | End: 2019-01-16

## 2018-09-17 RX ORDER — GABAPENTIN 600 MG/1
600 TABLET ORAL DAILY
Qty: 90 TABLET | Refills: 0 | Status: SHIPPED | OUTPATIENT
Start: 2018-09-17 | End: 2019-01-16

## 2018-09-17 NOTE — TELEPHONE ENCOUNTER
From: Karma Zacarias Loss  To: Silas Caruso MD  Sent: 9/17/2018 2:37 PM CDT  Subject: Prescription Question    Hi Dr. Nisha Villafuerte,    I have an issue with my Gabapentin prescription dosage.  I received a letter from [address] [place] [address] [address] stating that on Oct 1, 2018 my plan

## 2018-09-17 NOTE — TELEPHONE ENCOUNTER
Ok to change to 600mg dose, one per day and continue 300mg dose 2 times per day and provide scripts for both for 90 days. Thanks.

## 2018-09-17 NOTE — TELEPHONE ENCOUNTER
Dr. Mendel Downy please advise. Is there a higher strength of gabapentin available. If not will PA for current dose.

## 2018-10-01 ENCOUNTER — MYAURORA ACCOUNT LINK (OUTPATIENT)
Dept: OTHER | Age: 83
End: 2018-10-01

## 2018-10-01 ENCOUNTER — PRIOR ORIGINAL RECORDS (OUTPATIENT)
Dept: OTHER | Age: 83
End: 2018-10-01

## 2018-10-17 ENCOUNTER — APPOINTMENT (OUTPATIENT)
Dept: LAB | Age: 83
End: 2018-10-17
Attending: INTERNAL MEDICINE
Payer: MEDICARE

## 2018-10-17 ENCOUNTER — OFFICE VISIT (OUTPATIENT)
Dept: ENDOCRINOLOGY CLINIC | Facility: CLINIC | Age: 83
End: 2018-10-17
Payer: MEDICARE

## 2018-10-17 VITALS
SYSTOLIC BLOOD PRESSURE: 119 MMHG | HEART RATE: 56 BPM | DIASTOLIC BLOOD PRESSURE: 72 MMHG | WEIGHT: 193 LBS | BODY MASS INDEX: 35 KG/M2

## 2018-10-17 DIAGNOSIS — E11.65 DIABETES MELLITUS TYPE 2, UNCONTROLLED, WITH COMPLICATIONS (HCC): ICD-10-CM

## 2018-10-17 DIAGNOSIS — E11.65 DIABETES MELLITUS TYPE 2, UNCONTROLLED, WITH COMPLICATIONS (HCC): Primary | ICD-10-CM

## 2018-10-17 DIAGNOSIS — E11.8 DIABETES MELLITUS TYPE 2, UNCONTROLLED, WITH COMPLICATIONS (HCC): Primary | ICD-10-CM

## 2018-10-17 DIAGNOSIS — E11.8 DIABETES MELLITUS TYPE 2, UNCONTROLLED, WITH COMPLICATIONS (HCC): ICD-10-CM

## 2018-10-17 PROCEDURE — 99213 OFFICE O/P EST LOW 20 MIN: CPT | Performed by: INTERNAL MEDICINE

## 2018-10-17 PROCEDURE — 82962 GLUCOSE BLOOD TEST: CPT | Performed by: INTERNAL MEDICINE

## 2018-10-17 PROCEDURE — 83036 HEMOGLOBIN GLYCOSYLATED A1C: CPT | Performed by: INTERNAL MEDICINE

## 2018-10-17 PROCEDURE — 80048 BASIC METABOLIC PNL TOTAL CA: CPT

## 2018-10-17 PROCEDURE — 36416 COLLJ CAPILLARY BLOOD SPEC: CPT | Performed by: INTERNAL MEDICINE

## 2018-10-17 PROCEDURE — 36415 COLL VENOUS BLD VENIPUNCTURE: CPT

## 2018-10-17 RX ORDER — BLOOD SUGAR DIAGNOSTIC
STRIP MISCELLANEOUS
Qty: 150 STRIP | Refills: 3 | Status: SHIPPED | OUTPATIENT
Start: 2018-10-17 | End: 2019-01-16

## 2018-10-17 NOTE — PATIENT INSTRUCTIONS
Tresiba 16 units SQ daily if sugar under 160    Tresiba 20 units SQ daily if sugar above 160    Tresiba 22 units SQ daily if sugar above 250

## 2018-10-17 NOTE — PROGRESS NOTES
Name: Rhea Zepeda  Date: 10/17/2018    Referring Physician: No ref.  provider found    HISTORY OF PRESENT ILLNESS   Rhea Zepeda is a 80year old female who presents for     She was hospitalized in 11/2017 with severe RSV PNA requiring intubation and darshana and 600mg at bedtime, Disp: 180 capsule, Rfl: 0  •  glimepiride 4 MG Oral Tab, TAKE 1 TABLET EVERY MORNING BEFORE BREAKFAST, Disp: 90 tablet, Rfl: 1  •  Glucose Blood (ONETOUCH VERIO) In Vitro Strip, Use to check sugars 2 times daily, Disp: 100 strip, Rfl: hydroxide (MILK OF MAGNESIA) 400 MG/5ML Oral Suspension, Take 30 mL by mouth daily as needed for constipation. , Disp: , Rfl:   •  guaiFENesin 100mg/5ml, Take 50 mg by mouth every 4 (four) hours as needed. , Disp: , Rfl:   •  Multiple Vitamins-Minerals (Lilly Hubertz Location: [address] [place] [address] ENDOSCOPY   • COLONOSCOPY N/A 1/8/2018    Performed by Sunday Whittington MD at [address] [place] [address] ENDOSCOPY   • EGD  01/2018   • ESOPHAGOGASTRODUODENOSCOPY (EGD) N/A 3/9/2018    Performed by Babak Dukes MD at [place] [address] SQ if BG level above 250  -Continue Glimepiride 4mg PO daily   -Call if BG level above 350  -Check renal function to determine if stable  -If decline in renal function then will be more aggressive for BG control     RTC 3 months     10/17/2018  Jeffry Roberto

## 2019-01-10 ENCOUNTER — PRIOR ORIGINAL RECORDS (OUTPATIENT)
Dept: OTHER | Age: 84
End: 2019-01-10

## 2019-01-16 ENCOUNTER — OFFICE VISIT (OUTPATIENT)
Dept: ENDOCRINOLOGY CLINIC | Facility: CLINIC | Age: 84
End: 2019-01-16
Payer: MEDICARE

## 2019-01-16 VITALS
DIASTOLIC BLOOD PRESSURE: 67 MMHG | SYSTOLIC BLOOD PRESSURE: 103 MMHG | HEART RATE: 54 BPM | HEIGHT: 62 IN | BODY MASS INDEX: 35.33 KG/M2 | WEIGHT: 192 LBS

## 2019-01-16 DIAGNOSIS — E11.65 UNCONTROLLED TYPE 2 DIABETES MELLITUS WITH HYPERGLYCEMIA (HCC): Primary | ICD-10-CM

## 2019-01-16 LAB
CARTRIDGE LOT#: ABNORMAL NUMERIC
GLUCOSE BLOOD: 207
HEMOGLOBIN A1C: 8.9 % (ref 4.3–5.6)
TEST STRIP LOT #: NORMAL NUMERIC

## 2019-01-16 PROCEDURE — 82962 GLUCOSE BLOOD TEST: CPT | Performed by: INTERNAL MEDICINE

## 2019-01-16 PROCEDURE — 83036 HEMOGLOBIN GLYCOSYLATED A1C: CPT | Performed by: INTERNAL MEDICINE

## 2019-01-16 PROCEDURE — G0463 HOSPITAL OUTPT CLINIC VISIT: HCPCS | Performed by: INTERNAL MEDICINE

## 2019-01-16 PROCEDURE — 99214 OFFICE O/P EST MOD 30 MIN: CPT | Performed by: INTERNAL MEDICINE

## 2019-01-16 PROCEDURE — 36416 COLLJ CAPILLARY BLOOD SPEC: CPT | Performed by: INTERNAL MEDICINE

## 2019-01-16 RX ORDER — BLOOD SUGAR DIAGNOSTIC
STRIP MISCELLANEOUS
Qty: 150 STRIP | Refills: 3 | Status: SHIPPED | OUTPATIENT
Start: 2019-01-16

## 2019-01-16 RX ORDER — GABAPENTIN 600 MG/1
600 TABLET ORAL 3 TIMES DAILY
Qty: 270 TABLET | Refills: 1 | Status: SHIPPED | OUTPATIENT
Start: 2019-01-16 | End: 2019-06-25

## 2019-01-16 NOTE — PROGRESS NOTES
Name: Mushtaq Zepeda  Date: 1/16/2019    Referring Physician: No ref. provider found    HISTORY OF PRESENT ILLNESS   Mushtaq Zepeda is a 80year old female who presents for diabetes mellitus.      She was hospitalized in 11/2017 with severe RSV PNA requiring Solution Pen-injector, Inject 20 Units into the skin daily.   , Disp: , Rfl:   •  Glucose Blood (ONETOUCH VERIO) In Vitro Strip, Use to check sugars 2 times daily, Disp: 150 strip, Rfl: 3  •  gabapentin 600 MG Oral Tab, Take 1 tablet (600 mg total) by mouth mg total) by mouth 2 (two) times daily with meals. , Disp: 60 tablet, Rfl: 1  •  magnesium hydroxide (MILK OF MAGNESIA) 400 MG/5ML Oral Suspension, Take 30 mL by mouth daily as needed for constipation. , Disp: , Rfl:   •  guaiFENesin 100mg/5ml, Take 50 mg by Wt 192 lb (87.1 kg)   BMI 35.12 kg/m²     General Appearance:  alert, well developed, in no acute distress  Eyes:  normal conjunctivae, sclera. , normal sclera and normal pupils  Ears/Nose/Mouth/Throat/Neck:  no palpable thyroid nodules or cervical lympha function is currently stable  -Foot exam performed today      This is a 25 minute visit and greater than 50% of the time was spent counseling the patient and/or coordinating care.     RTC 3 months     1/16/2019  Erin Sanchez MD

## 2019-01-21 ENCOUNTER — TELEPHONE (OUTPATIENT)
Dept: ENDOCRINOLOGY CLINIC | Facility: CLINIC | Age: 84
End: 2019-01-21

## 2019-01-21 RX ORDER — BLOOD SUGAR DIAGNOSTIC
STRIP MISCELLANEOUS
Qty: 100 STRIP | Refills: 0 | Status: SHIPPED | OUTPATIENT
Start: 2019-01-21

## 2019-01-21 NOTE — TELEPHONE ENCOUNTER
Patient has medicare and should cover any brand testing supply but does require insulin usage and diagnosis code. Sent back with required information.

## 2019-01-21 NOTE — TELEPHONE ENCOUNTER
Pharmacy faxed a request for a new RX for One Touch Verio Test Strips 25's. Plan does not covered medication prescribed.  Requests an alternative

## 2019-02-01 RX ORDER — GLIMEPIRIDE 4 MG/1
TABLET ORAL
Qty: 90 TABLET | Refills: 1 | Status: SHIPPED | OUTPATIENT
Start: 2019-02-01 | End: 2019-07-19

## 2019-02-01 NOTE — TELEPHONE ENCOUNTER
Current Outpatient Medications:  glimepiride 4 MG Oral Tab TAKE 1 TABLET EVERY MORNING BEFORE BREAKFAST Disp: 90 tablet Rfl: 1     REFILL

## 2019-02-05 ENCOUNTER — PRIOR ORIGINAL RECORDS (OUTPATIENT)
Dept: OTHER | Age: 84
End: 2019-02-05

## 2019-02-19 ENCOUNTER — PRIOR ORIGINAL RECORDS (OUTPATIENT)
Dept: OTHER | Age: 84
End: 2019-02-19

## 2019-02-28 VITALS
WEIGHT: 204 LBS | BODY MASS INDEX: 37.54 KG/M2 | DIASTOLIC BLOOD PRESSURE: 54 MMHG | HEIGHT: 62 IN | HEART RATE: 60 BPM | SYSTOLIC BLOOD PRESSURE: 122 MMHG

## 2019-02-28 VITALS
HEIGHT: 62 IN | WEIGHT: 204 LBS | DIASTOLIC BLOOD PRESSURE: 62 MMHG | HEART RATE: 56 BPM | BODY MASS INDEX: 37.54 KG/M2 | SYSTOLIC BLOOD PRESSURE: 120 MMHG

## 2019-02-28 VITALS
SYSTOLIC BLOOD PRESSURE: 100 MMHG | HEIGHT: 62 IN | DIASTOLIC BLOOD PRESSURE: 62 MMHG | BODY MASS INDEX: 38.09 KG/M2 | HEART RATE: 62 BPM | WEIGHT: 207 LBS

## 2019-02-28 VITALS — WEIGHT: 213 LBS | BODY MASS INDEX: 39.2 KG/M2 | HEIGHT: 62 IN | HEART RATE: 72 BPM

## 2019-02-28 VITALS
SYSTOLIC BLOOD PRESSURE: 126 MMHG | HEART RATE: 60 BPM | WEIGHT: 193 LBS | DIASTOLIC BLOOD PRESSURE: 58 MMHG | HEIGHT: 55 IN | BODY MASS INDEX: 44.66 KG/M2

## 2019-02-28 VITALS
HEART RATE: 60 BPM | DIASTOLIC BLOOD PRESSURE: 54 MMHG | WEIGHT: 208 LBS | BODY MASS INDEX: 39.27 KG/M2 | HEIGHT: 61 IN | SYSTOLIC BLOOD PRESSURE: 100 MMHG

## 2019-03-05 ENCOUNTER — PATIENT MESSAGE (OUTPATIENT)
Dept: ENDOCRINOLOGY CLINIC | Facility: CLINIC | Age: 84
End: 2019-03-05

## 2019-05-22 ENCOUNTER — OFFICE VISIT (OUTPATIENT)
Dept: ENDOCRINOLOGY CLINIC | Facility: CLINIC | Age: 84
End: 2019-05-22
Payer: MEDICARE

## 2019-05-22 VITALS
BODY MASS INDEX: 35.88 KG/M2 | HEIGHT: 62 IN | SYSTOLIC BLOOD PRESSURE: 127 MMHG | HEART RATE: 58 BPM | WEIGHT: 195 LBS | DIASTOLIC BLOOD PRESSURE: 69 MMHG

## 2019-05-22 DIAGNOSIS — E11.65 UNCONTROLLED TYPE 2 DIABETES MELLITUS WITH HYPERGLYCEMIA (HCC): Primary | ICD-10-CM

## 2019-05-22 PROCEDURE — 82962 GLUCOSE BLOOD TEST: CPT | Performed by: INTERNAL MEDICINE

## 2019-05-22 PROCEDURE — 99214 OFFICE O/P EST MOD 30 MIN: CPT | Performed by: INTERNAL MEDICINE

## 2019-05-22 PROCEDURE — G0463 HOSPITAL OUTPT CLINIC VISIT: HCPCS | Performed by: INTERNAL MEDICINE

## 2019-05-22 PROCEDURE — 36416 COLLJ CAPILLARY BLOOD SPEC: CPT | Performed by: INTERNAL MEDICINE

## 2019-05-22 PROCEDURE — 83036 HEMOGLOBIN GLYCOSYLATED A1C: CPT | Performed by: INTERNAL MEDICINE

## 2019-05-22 NOTE — PROGRESS NOTES
Name: Dionne Zepeda  Date: 5/22/2019    Referring Physician: No ref. provider found    HISTORY OF PRESENT ILLNESS   Dionne Zepeda is a 80year old female who presents for diabetes mellitus.      She was hospitalized in 11/2017 with severe RSV PNA requiring insulin once daily.  DX: E11.65 with insulin use, Disp: 90 each, Rfl: 2  •  glimepiride 4 MG Oral Tab, TAKE 1 TABLET EVERY MORNING BEFORE BREAKFAST, Disp: 90 tablet, Rfl: 1  •  Glucose Blood (ONETOUCH VERIO) In Vitro Strip, Check sugars twice daily as direc PEG 3350 Oral Powd Pack, Take 17 g by mouth daily as needed (constipation). , Disp: 30 each, Rfl: 1  •  ferrous sulfate 325 (65 FE) MG Oral Tab EC, Take 1 tablet (325 mg total) by mouth 2 (two) times daily with meals. , Disp: 60 tablet, Rfl: 1  •  magnesium ESOPHAGOGASTRODUODENOSCOPY (EGD) N/A 1/7/2018    Performed by Lilly Jernigan MD at [facility]  /69   Pulse 58   Ht 5' 2\" (1.575 m)   Wt 195 lb (88.5 kg)   BMI 35.67 kg/m²     General Appearance:  alert, well developed, in -Continue Glimepiride 4mg PO daily   -Call if BG level above 350  -Renal function is currently stable  -Foot exam performed 1/2019    This is a 25 minute visit and greater than 50% of the time was spent counseling the patient and/or coordinating care.

## 2019-05-22 NOTE — PATIENT INSTRUCTIONS
Increase Tresiba to 30 units SQ with dinner    If morning sugar is still above 120 after increasing [place] for a week then increase dose to 36 units SQ dinner     If morning sugar is under 100 more than one time then send [employer] message to clinic    If s

## 2019-06-26 RX ORDER — GABAPENTIN 600 MG/1
600 TABLET ORAL 3 TIMES DAILY
Qty: 270 TABLET | Refills: 1 | Status: SHIPPED | OUTPATIENT
Start: 2019-06-26 | End: 2019-11-11

## 2019-07-08 ENCOUNTER — APPOINTMENT (OUTPATIENT)
Dept: CARDIOLOGY | Age: 84
End: 2019-07-08

## 2019-07-11 ENCOUNTER — APPOINTMENT (OUTPATIENT)
Dept: CARDIOLOGY | Age: 84
End: 2019-07-11

## 2019-07-16 ENCOUNTER — TELEPHONE (OUTPATIENT)
Dept: CARDIOLOGY | Age: 84
End: 2019-07-16

## 2019-07-19 RX ORDER — GLIMEPIRIDE 4 MG/1
TABLET ORAL
Qty: 90 TABLET | Refills: 1 | Status: SHIPPED | OUTPATIENT
Start: 2019-07-19 | End: 2020-02-12

## 2019-08-01 ENCOUNTER — TELEPHONE (OUTPATIENT)
Dept: ENDOCRINOLOGY CLINIC | Facility: CLINIC | Age: 84
End: 2019-08-01

## 2019-08-01 NOTE — TELEPHONE ENCOUNTER
Pt sent this message through My Chart/Scheduling Requests:        I scheduled an appointment at the [facility] office on Aug 12 at 4:15pm.  I have a few questions for Dr Katelyn Quintero.       My discharge instructions from the rehab facility for my insulation is below

## 2019-08-01 NOTE — TELEPHONE ENCOUNTER
Spoke with daughter. Patient was hospitalized and then in rehab following a fall and fracture. Discharged home with different medication regimen. Has appt booked for 8/12/19 but asking for what to take until visit.      Discharged from rehab yesterday on:

## 2019-08-01 NOTE — TELEPHONE ENCOUNTER
Spoke with David again. Discussed plan below. Understands to have mom stop Humalog and change long acting insulin to Tresiba 45 units SQ nightly. Will continue taking Glimepiride and send email or call next week with BG readings to determine further plan.

## 2019-08-01 NOTE — TELEPHONE ENCOUNTER
Lets stop the Humalog for now, stop Basaglar and start Tresiba 45 units SQ QHS and continue glimepiride. Let me know how sugars look next week. Thanks.

## 2019-08-02 ENCOUNTER — TELEPHONE (OUTPATIENT)
Dept: ENDOCRINOLOGY CLINIC | Facility: CLINIC | Age: 84
End: 2019-08-02

## 2019-08-02 NOTE — TELEPHONE ENCOUNTER
Spoke w/ Horace Sanchez who verbalizes understanding of decreasing Tresiba 45 units --> 35 units SQ QHS. Discussed that she should continue checking BG before breakfast and before dinner, and call if readings below 100.

## 2019-08-02 NOTE — TELEPHONE ENCOUNTER
Saira calling to inform [facility] that fasting bg this morning was at 62 mg/dL. Sadia Santos states pt did have breakfast around 8am- bowl of plain oatmeal with butter and a piece toast with jam.  Pt did take 4mg Glimepiride before eating. Pls call. Thank you.

## 2019-08-02 NOTE — TELEPHONE ENCOUNTER
Spoke w/ daughter, Scooby Hernandez [facility]). Reporting BG of 62 this morning at 8 am and treated (ate breakfast). Repeat BG while on the phone w/ RN is 157. Took glimepiride 4 mg with breakfast.    Before dinner last night BG -168.  Gave first dose of Tresiba 45 units at

## 2019-08-05 ENCOUNTER — TELEPHONE (OUTPATIENT)
Dept: CARDIOLOGY | Age: 84
End: 2019-08-05

## 2019-08-07 ENCOUNTER — HOSPITAL ENCOUNTER (INPATIENT)
Facility: HOSPITAL | Age: 84
LOS: 3 days | Discharge: HOME HEALTH | DRG: 378 | End: 2019-08-10
Attending: EMERGENCY MEDICINE | Admitting: INTERNAL MEDICINE
Payer: MEDICARE

## 2019-08-07 DIAGNOSIS — D64.9 SEVERE ANEMIA: ICD-10-CM

## 2019-08-07 DIAGNOSIS — K92.2 ACUTE GI BLEEDING: Primary | ICD-10-CM

## 2019-08-07 DIAGNOSIS — K29.70 GASTRITIS WITHOUT BLEEDING, UNSPECIFIED CHRONICITY, UNSPECIFIED GASTRITIS TYPE: ICD-10-CM

## 2019-08-07 DIAGNOSIS — R53.83 FATIGUE, UNSPECIFIED TYPE: ICD-10-CM

## 2019-08-07 DIAGNOSIS — Q27.30 AVM (ARTERIOVENOUS MALFORMATION): ICD-10-CM

## 2019-08-07 LAB
ANION GAP SERPL CALC-SCNC: 8 MMOL/L (ref 0–18)
ANTIBODY SCREEN: NEGATIVE
APTT PPP: 32.9 SECONDS (ref 23.2–35.3)
BASOPHILS # BLD AUTO: 0 X10(3) UL (ref 0–0.2)
BASOPHILS # BLD AUTO: 0.01 X10(3) UL (ref 0–0.2)
BASOPHILS NFR BLD AUTO: 0 %
BASOPHILS NFR BLD AUTO: 0.1 %
BUN BLD-MCNC: 47 MG/DL (ref 7–18)
BUN/CREAT SERPL: 34.8 (ref 10–20)
CALCIUM BLD-MCNC: 8.5 MG/DL (ref 8.5–10.1)
CHLORIDE SERPL-SCNC: 106 MMOL/L (ref 98–112)
CO2 SERPL-SCNC: 28 MMOL/L (ref 21–32)
CREAT BLD-MCNC: 1.35 MG/DL (ref 0.55–1.02)
DEPRECATED RDW RBC AUTO: 54.8 FL (ref 35.1–46.3)
DEPRECATED RDW RBC AUTO: 55.2 FL (ref 35.1–46.3)
EOSINOPHIL # BLD AUTO: 0.06 X10(3) UL (ref 0–0.7)
EOSINOPHIL # BLD AUTO: 0.11 X10(3) UL (ref 0–0.7)
EOSINOPHIL NFR BLD AUTO: 1.3 %
EOSINOPHIL NFR BLD AUTO: 1.6 %
ERYTHROCYTE [DISTWIDTH] IN BLOOD BY AUTOMATED COUNT: 15.9 % (ref 11–15)
ERYTHROCYTE [DISTWIDTH] IN BLOOD BY AUTOMATED COUNT: 16.6 % (ref 11–15)
GLUCOSE BLD-MCNC: 228 MG/DL (ref 70–99)
GLUCOSE BLDC GLUCOMTR-MCNC: 132 MG/DL (ref 70–99)
GLUCOSE BLDC GLUCOMTR-MCNC: 139 MG/DL (ref 70–99)
HCT VFR BLD AUTO: 17.8 % (ref 35–48)
HCT VFR BLD AUTO: 22.8 % (ref 35–48)
HGB BLD-MCNC: 5.3 G/DL (ref 12–16)
HGB BLD-MCNC: 7.1 G/DL (ref 12–16)
IMM GRANULOCYTES # BLD AUTO: 0.02 X10(3) UL (ref 0–1)
IMM GRANULOCYTES # BLD AUTO: 0.03 X10(3) UL (ref 0–1)
IMM GRANULOCYTES NFR BLD: 0.3 %
IMM GRANULOCYTES NFR BLD: 0.6 %
INR BLD: 2.3 (ref 0.9–1.2)
LYMPHOCYTES # BLD AUTO: 1.87 X10(3) UL (ref 1–4)
LYMPHOCYTES # BLD AUTO: 1.96 X10(3) UL (ref 1–4)
LYMPHOCYTES NFR BLD AUTO: 27.9 %
LYMPHOCYTES NFR BLD AUTO: 41.5 %
MCH RBC QN AUTO: 28.1 PG (ref 26–34)
MCH RBC QN AUTO: 28.5 PG (ref 26–34)
MCHC RBC AUTO-ENTMCNC: 29.8 G/DL (ref 31–37)
MCHC RBC AUTO-ENTMCNC: 31.1 G/DL (ref 31–37)
MCV RBC AUTO: 90.1 FL (ref 80–100)
MCV RBC AUTO: 95.7 FL (ref 80–100)
MONOCYTES # BLD AUTO: 0.65 X10(3) UL (ref 0.1–1)
MONOCYTES # BLD AUTO: 0.72 X10(3) UL (ref 0.1–1)
MONOCYTES NFR BLD AUTO: 10.7 %
MONOCYTES NFR BLD AUTO: 13.8 %
NEUTROPHILS # BLD AUTO: 2.02 X10 (3) UL (ref 1.5–7.7)
NEUTROPHILS # BLD AUTO: 2.02 X10(3) UL (ref 1.5–7.7)
NEUTROPHILS # BLD AUTO: 3.97 X10 (3) UL (ref 1.5–7.7)
NEUTROPHILS # BLD AUTO: 3.97 X10(3) UL (ref 1.5–7.7)
NEUTROPHILS NFR BLD AUTO: 42.8 %
NEUTROPHILS NFR BLD AUTO: 59.4 %
OSMOLALITY SERPL CALC.SUM OF ELEC: 313 MOSM/KG (ref 275–295)
PLATELET # BLD AUTO: 180 10(3)UL (ref 150–450)
PLATELET # BLD AUTO: 213 10(3)UL (ref 150–450)
POTASSIUM SERPL-SCNC: 4.2 MMOL/L (ref 3.5–5.1)
PROTHROMBIN TIME: 25.6 SECONDS (ref 11.8–14.5)
RBC # BLD AUTO: 1.86 X10(6)UL (ref 3.8–5.3)
RBC # BLD AUTO: 2.53 X10(6)UL (ref 3.8–5.3)
RH BLOOD TYPE: POSITIVE
SODIUM SERPL-SCNC: 142 MMOL/L (ref 136–145)
TROPONIN I SERPL-MCNC: <0.045 NG/ML (ref ?–0.04)
WBC # BLD AUTO: 4.7 X10(3) UL (ref 4–11)
WBC # BLD AUTO: 6.7 X10(3) UL (ref 4–11)

## 2019-08-07 PROCEDURE — 85730 THROMBOPLASTIN TIME PARTIAL: CPT | Performed by: EMERGENCY MEDICINE

## 2019-08-07 PROCEDURE — C9113 INJ PANTOPRAZOLE SODIUM, VIA: HCPCS | Performed by: EMERGENCY MEDICINE

## 2019-08-07 PROCEDURE — 84484 ASSAY OF TROPONIN QUANT: CPT | Performed by: EMERGENCY MEDICINE

## 2019-08-07 PROCEDURE — 82962 GLUCOSE BLOOD TEST: CPT

## 2019-08-07 PROCEDURE — 85060 BLOOD SMEAR INTERPRETATION: CPT | Performed by: EMERGENCY MEDICINE

## 2019-08-07 PROCEDURE — 96376 TX/PRO/DX INJ SAME DRUG ADON: CPT

## 2019-08-07 PROCEDURE — 93010 ELECTROCARDIOGRAM REPORT: CPT | Performed by: INTERNAL MEDICINE

## 2019-08-07 PROCEDURE — 36430 TRANSFUSION BLD/BLD COMPNT: CPT

## 2019-08-07 PROCEDURE — 96374 THER/PROPH/DIAG INJ IV PUSH: CPT

## 2019-08-07 PROCEDURE — 86850 RBC ANTIBODY SCREEN: CPT | Performed by: EMERGENCY MEDICINE

## 2019-08-07 PROCEDURE — 80048 BASIC METABOLIC PNL TOTAL CA: CPT | Performed by: EMERGENCY MEDICINE

## 2019-08-07 PROCEDURE — 85025 COMPLETE CBC W/AUTO DIFF WBC: CPT | Performed by: EMERGENCY MEDICINE

## 2019-08-07 PROCEDURE — 86900 BLOOD TYPING SEROLOGIC ABO: CPT | Performed by: EMERGENCY MEDICINE

## 2019-08-07 PROCEDURE — 99285 EMERGENCY DEPT VISIT HI MDM: CPT

## 2019-08-07 PROCEDURE — 86901 BLOOD TYPING SEROLOGIC RH(D): CPT | Performed by: EMERGENCY MEDICINE

## 2019-08-07 PROCEDURE — 86920 COMPATIBILITY TEST SPIN: CPT

## 2019-08-07 PROCEDURE — 99222 1ST HOSP IP/OBS MODERATE 55: CPT | Performed by: INTERNAL MEDICINE

## 2019-08-07 PROCEDURE — 93005 ELECTROCARDIOGRAM TRACING: CPT

## 2019-08-07 PROCEDURE — 85610 PROTHROMBIN TIME: CPT | Performed by: EMERGENCY MEDICINE

## 2019-08-07 PROCEDURE — 85025 COMPLETE CBC W/AUTO DIFF WBC: CPT | Performed by: INTERNAL MEDICINE

## 2019-08-07 PROCEDURE — C9113 INJ PANTOPRAZOLE SODIUM, VIA: HCPCS | Performed by: INTERNAL MEDICINE

## 2019-08-07 PROCEDURE — 30233N1 TRANSFUSION OF NONAUTOLOGOUS RED BLOOD CELLS INTO PERIPHERAL VEIN, PERCUTANEOUS APPROACH: ICD-10-PCS | Performed by: EMERGENCY MEDICINE

## 2019-08-07 RX ORDER — SODIUM CHLORIDE 9 MG/ML
INJECTION, SOLUTION INTRAVENOUS CONTINUOUS
Status: DISCONTINUED | OUTPATIENT
Start: 2019-08-07 | End: 2019-08-08

## 2019-08-07 RX ORDER — ATORVASTATIN CALCIUM 20 MG/1
20 TABLET, FILM COATED ORAL NIGHTLY
Status: DISCONTINUED | OUTPATIENT
Start: 2019-08-07 | End: 2019-08-10

## 2019-08-07 RX ORDER — GABAPENTIN 600 MG/1
600 TABLET ORAL 3 TIMES DAILY
Status: DISCONTINUED | OUTPATIENT
Start: 2019-08-07 | End: 2019-08-08

## 2019-08-07 RX ORDER — ONDANSETRON 2 MG/ML
4 INJECTION INTRAMUSCULAR; INTRAVENOUS EVERY 6 HOURS PRN
Status: DISCONTINUED | OUTPATIENT
Start: 2019-08-07 | End: 2019-08-10

## 2019-08-07 RX ORDER — ACETAMINOPHEN 325 MG/1
650 TABLET ORAL EVERY 6 HOURS PRN
Status: DISCONTINUED | OUTPATIENT
Start: 2019-08-07 | End: 2019-08-10

## 2019-08-07 RX ORDER — AMIODARONE HYDROCHLORIDE 200 MG/1
200 TABLET ORAL DAILY
Status: DISCONTINUED | OUTPATIENT
Start: 2019-08-08 | End: 2019-08-10

## 2019-08-07 RX ORDER — SODIUM CHLORIDE 9 MG/ML
INJECTION, SOLUTION INTRAVENOUS ONCE
Status: COMPLETED | OUTPATIENT
Start: 2019-08-07 | End: 2019-08-07

## 2019-08-07 RX ORDER — METOCLOPRAMIDE HYDROCHLORIDE 5 MG/ML
5 INJECTION INTRAMUSCULAR; INTRAVENOUS EVERY 8 HOURS PRN
Status: DISCONTINUED | OUTPATIENT
Start: 2019-08-07 | End: 2019-08-10

## 2019-08-07 RX ORDER — SODIUM CHLORIDE 0.9 % (FLUSH) 0.9 %
10 SYRINGE (ML) INJECTION AS NEEDED
Status: DISCONTINUED | OUTPATIENT
Start: 2019-08-07 | End: 2019-08-10

## 2019-08-07 RX ORDER — SODIUM CHLORIDE 0.9 % (FLUSH) 0.9 %
3 SYRINGE (ML) INJECTION AS NEEDED
Status: DISCONTINUED | OUTPATIENT
Start: 2019-08-07 | End: 2019-08-10

## 2019-08-07 RX ORDER — DEXTROSE MONOHYDRATE 25 G/50ML
50 INJECTION, SOLUTION INTRAVENOUS AS NEEDED
Status: DISCONTINUED | OUTPATIENT
Start: 2019-08-07 | End: 2019-08-08

## 2019-08-07 NOTE — ED NOTES
Orders for admission, patient is aware of plan and ready to go upstairs.  Any questions, please call ED RN Manish Paris  at extension [phone]

## 2019-08-07 NOTE — H&P
[facility] Hospitalist H&P     CC: Patient presents with:  GI Bleeding (gastrointestinal)       PCP: Sonu Aguayo    Admission Date: 8/7/2019    ASSESSMENT / PLAN:   Ms. Janessa Early is a 80year old female with PMH of IDDM, CAD, COPD, a fib s/p PPM on xarelto, HTN who stool is always black from iron and did not notice any difference, daughter states she has had small drops of blood in stool but nothing significant.  Patient states that over the last week she has felt more SOB and fatigued, she returned from rehab a week Degludec (TRESIBA FLEXTOUCH) 100 UNIT/ML Subcutaneous Solution Pen-injector Inject 25 Units into the skin nightly. Disp:  Rfl:    atorvastatin 20 MG Oral Tab Take 1 tablet (20 mg total) by mouth nightly.  Disp: 30 tablet Rfl: 1   allopurinol 100 MG Oral T CTAB, no crackles or wheezes  CV: RRR, no murmurs, 2+ peripheral pulses  ABD: Soft, non-tender, non-distended, +BS  MSK: strength 5/5 in all extremities  Neuro: Grossly normal, CN intact, sensory intact  Psych: Affect- normal  SKIN: warm, dry  EXT: no connie

## 2019-08-07 NOTE — ED NOTES
Pt transferred to floor with a bag of PRBC, Blood can be started when pt admitted to her room as per Dr Philip Coronado, PCT transferred pt to her room at this time, family with pt

## 2019-08-07 NOTE — CONSULTS
REFERRING PHYSICIAN: Dr. Seymour ref. provider found    HPI:         Thank you very much for requesting me to see the patient. Hx per chart and pt.      As you know, Rhea Zepeda is a 80year old female who presents today with h/o progressive generalized weakne PREOPERATIVE DIAGNOSIS: Anemia, r/o LGI source of blood loss.  POSTOPERATIVE DIAGNOSIS: Dverticulosis, rectal polyp, internal hemorrhoids         3/2018 -- EGD/APC -- \"The duodenum was noted to have 3 small vascular ectasias in the bulb, one of which was b in detail. PLAN: 1.) receiving 2nd unit of PRBC   2.) recommend Enteroscopy with MAC with possible APC pending repeat INR tomorrow. The patient indicates understanding of these issues and agrees to the plan. Thank you!        Saskia Noland MD.

## 2019-08-07 NOTE — PROGRESS NOTES
[facility] Pharmacy Note:  Renal Dose Adjustment for Metoclopramide (REGLAN)    Olivia Zepeda has been prescribed Metoclopramide (REGLAN) 10 mg every 8 hours as needed. Estimated Creatinine Clearance: 22.3 mL/min (A) (based on SCr of 1.35 mg/dL (H)).     Her ca

## 2019-08-07 NOTE — CONSULTS
[facility] HOSP - [facility]    Cardiology Consultation    Seun Zepeda Location: [address] [place] [address] 1930 MRN [id number]   Consulting Date 2019 [facility] [id number]   Consulting Physician Bill Tse MD Attending Physician Gloria Jackson MD (TYLENOL EXTRA STRENGTH OR) Take 1 tablet by mouth nightly as needed.  Disp:  Rfl:    glimepiride 4 MG Oral Tab TAKE 1 TABLET EVERY MORNINGBEFORE BREAKFAST Disp: 90 tablet Rfl: 1   gabapentin 600 MG Oral Tab Take 1 tablet (600 mg total) by mouth 3 (three) t (constipation). Disp: 30 each Rfl: 1   magnesium hydroxide (MILK OF MAGNESIA) 400 MG/5ML Oral Suspension Take 30 mL by mouth daily as needed for constipation. Disp:  Rfl:    Multiple Vitamins-Minerals (OCUVITE EXTRA) Oral Tab Take 1 tablet by mouth daily. voltage    Lab Results   Component Value Date    WBC 4.7 08/07/2019    HGB 5.3 08/07/2019    HCT 17.8 08/07/2019    .0 08/07/2019    CREATSERUM 1.35 08/07/2019    BUN 47 08/07/2019     08/07/2019    K 4.2 08/07/2019     08/07/2019    CO2

## 2019-08-07 NOTE — ED NOTES
Per family, pt informed to come in today after having lab work completed yesterday. Pts Hgb was critical at 5.6 per family. Pale to room and weakness noted at home. Per family, pt always with tarry stools as she is on iron.  Some bright red bloody stools no

## 2019-08-07 NOTE — ED PROVIDER NOTES
Patient Seen in: [facility] AND [facility] Emergency Department    History   Patient presents with:  GI Bleeding (gastrointestinal)    Stated Complaint:     HPI    Patient is here with reported hemoglobin level of 5.6 measured yesterday.   She has had lack of frank Smoker      Smokeless tobacco: Never Used    Alcohol use: No    Drug use: No        Medications :   docusate sodium 100 MG Oral Cap,  Take 1 capsule (100 mg total) by mouth 2 (two) times daily.    Acetaminophen (TYLENOL EXTRA STRENGTH OR),  Take 1 tablet by with meals. guaiFENesin 100mg/5ml,  Take 50 mg by mouth every 4 (four) hours as needed.          Review of Systems  Constitutional: no fever,   Cardiovascular: no chest pain  Respiratory: no shortness of breath at this time but feels tired and short of br have paged Dr. [facility] the gastroenterologist.  He is apparently not working anymore his partner Dr. Mitchell Jarrett did accept this patient as consultant. He requested [facility] hospitalist to be admitting doctor.   At this point we will hold off on reversal.  I did talk wi 6.7 (*)     HCT 22.2 (*)     MCHC 30.2 (*)     RDW 17.3 (*)     RDW-SD 58.5 (*)     All other components within normal limits   PROTHROMBIN TIME (PT) - Abnormal; Notable for the following components:    PT 17.6 (*)     INR 1.45 (*)     All other components GLUCOSE - Abnormal; Notable for the following components:    POC Glucose  110 (*)     All other components within normal limits   POCT GLUCOSE - Abnormal; Notable for the following components:    POC Glucose  119 (*)     All other components within normal Abnormality         Status                     ---------                               -----------         ------                     CBC W/ DIFFERENTIAL[[id number]]          Abnormal            Final result                 Pleas gastritis type    Disposition:  Admit    Follow-up:  Claris Kocher  [address] [address]  [phone]    Schedule an appointment as soon as possible for a visit in 1 week  need CBC in 1 week    Skyler Burleson MD  100

## 2019-08-07 NOTE — ED INITIAL ASSESSMENT (HPI)
Patient brought in by daughter for low hemoglobin of 5.6. Patient has been having dark stools per daughter. Dropped in 1 week from 11. Patient is on xarelto.

## 2019-08-07 NOTE — H&P (VIEW-ONLY)
REFERRING PHYSICIAN: Dr. Seymour ref. provider found    HPI:         Thank you very much for requesting me to see the patient. Hx per chart and pt.      As you know, Madonna Zepeda is a 80year old female who presents today with h/o progressive generalized weakne PREOPERATIVE DIAGNOSIS: Anemia, r/o LGI source of blood loss.  POSTOPERATIVE DIAGNOSIS: Dverticulosis, rectal polyp, internal hemorrhoids         3/2018 -- EGD/APC -- \"The duodenum was noted to have 3 small vascular ectasias in the bulb, one of which was b in detail. PLAN: 1.) receiving 2nd unit of PRBC   2.) recommend Enteroscopy with MAC with possible APC pending repeat INR tomorrow. The patient indicates understanding of these issues and agrees to the plan. Thank you!        Afshan Montes MD.

## 2019-08-08 ENCOUNTER — ANESTHESIA EVENT (OUTPATIENT)
Dept: ENDOSCOPY | Facility: HOSPITAL | Age: 84
DRG: 378 | End: 2019-08-08
Payer: MEDICARE

## 2019-08-08 ENCOUNTER — ANESTHESIA (OUTPATIENT)
Dept: ENDOSCOPY | Facility: HOSPITAL | Age: 84
DRG: 378 | End: 2019-08-08
Payer: MEDICARE

## 2019-08-08 LAB
ANION GAP SERPL CALC-SCNC: 6 MMOL/L (ref 0–18)
BLOOD TYPE BARCODE: 5100
BLOOD TYPE BARCODE: 5100
BUN BLD-MCNC: 42 MG/DL (ref 7–18)
BUN/CREAT SERPL: 35.3 (ref 10–20)
CALCIUM BLD-MCNC: 8.1 MG/DL (ref 8.5–10.1)
CHLORIDE SERPL-SCNC: 113 MMOL/L (ref 98–112)
CO2 SERPL-SCNC: 26 MMOL/L (ref 21–32)
CREAT BLD-MCNC: 1.19 MG/DL (ref 0.55–1.02)
DEPRECATED RDW RBC AUTO: 58.5 FL (ref 35.1–46.3)
ERYTHROCYTE [DISTWIDTH] IN BLOOD BY AUTOMATED COUNT: 17.3 % (ref 11–15)
GLUCOSE BLD-MCNC: 124 MG/DL (ref 70–99)
GLUCOSE BLDC GLUCOMTR-MCNC: 110 MG/DL (ref 70–99)
GLUCOSE BLDC GLUCOMTR-MCNC: 119 MG/DL (ref 70–99)
GLUCOSE BLDC GLUCOMTR-MCNC: 185 MG/DL (ref 70–99)
GLUCOSE BLDC GLUCOMTR-MCNC: 210 MG/DL (ref 70–99)
GLUCOSE BLDC GLUCOMTR-MCNC: 224 MG/DL (ref 70–99)
GLUCOSE BLDC GLUCOMTR-MCNC: 90 MG/DL (ref 70–99)
HCT VFR BLD AUTO: 22.2 % (ref 35–48)
HCT VFR BLD AUTO: 30 % (ref 35–48)
HGB BLD-MCNC: 6.7 G/DL (ref 12–16)
HGB BLD-MCNC: 9.2 G/DL (ref 12–16)
INR BLD: 1.45 (ref 0.9–1.2)
MCH RBC QN AUTO: 27.6 PG (ref 26–34)
MCHC RBC AUTO-ENTMCNC: 30.2 G/DL (ref 31–37)
MCV RBC AUTO: 91.4 FL (ref 80–100)
OSMOLALITY SERPL CALC.SUM OF ELEC: 312 MOSM/KG (ref 275–295)
PLATELET # BLD AUTO: 180 10(3)UL (ref 150–450)
POTASSIUM SERPL-SCNC: 3.7 MMOL/L (ref 3.5–5.1)
PROTHROMBIN TIME: 17.6 SECONDS (ref 11.8–14.5)
RBC # BLD AUTO: 2.43 X10(6)UL (ref 3.8–5.3)
SODIUM SERPL-SCNC: 145 MMOL/L (ref 136–145)
TROPONIN I SERPL-MCNC: <0.045 NG/ML (ref ?–0.04)
WBC # BLD AUTO: 5.3 X10(3) UL (ref 4–11)

## 2019-08-08 PROCEDURE — 0DB68ZX EXCISION OF STOMACH, VIA NATURAL OR ARTIFICIAL OPENING ENDOSCOPIC, DIAGNOSTIC: ICD-10-PCS | Performed by: INTERNAL MEDICINE

## 2019-08-08 PROCEDURE — 93005 ELECTROCARDIOGRAM TRACING: CPT

## 2019-08-08 PROCEDURE — 85018 HEMOGLOBIN: CPT | Performed by: INTERNAL MEDICINE

## 2019-08-08 PROCEDURE — 93010 ELECTROCARDIOGRAM REPORT: CPT | Performed by: HOSPITALIST

## 2019-08-08 PROCEDURE — 85027 COMPLETE CBC AUTOMATED: CPT | Performed by: INTERNAL MEDICINE

## 2019-08-08 PROCEDURE — 80048 BASIC METABOLIC PNL TOTAL CA: CPT | Performed by: INTERNAL MEDICINE

## 2019-08-08 PROCEDURE — 0W3P8ZZ CONTROL BLEEDING IN GASTROINTESTINAL TRACT, VIA NATURAL OR ARTIFICIAL OPENING ENDOSCOPIC: ICD-10-PCS | Performed by: INTERNAL MEDICINE

## 2019-08-08 PROCEDURE — 36430 TRANSFUSION BLD/BLD COMPNT: CPT

## 2019-08-08 PROCEDURE — 94640 AIRWAY INHALATION TREATMENT: CPT

## 2019-08-08 PROCEDURE — 85014 HEMATOCRIT: CPT | Performed by: INTERNAL MEDICINE

## 2019-08-08 PROCEDURE — 0DB78ZX EXCISION OF STOMACH, PYLORUS, VIA NATURAL OR ARTIFICIAL OPENING ENDOSCOPIC, DIAGNOSTIC: ICD-10-PCS | Performed by: INTERNAL MEDICINE

## 2019-08-08 PROCEDURE — 88305 TISSUE EXAM BY PATHOLOGIST: CPT | Performed by: INTERNAL MEDICINE

## 2019-08-08 PROCEDURE — 99232 SBSQ HOSP IP/OBS MODERATE 35: CPT | Performed by: NURSE PRACTITIONER

## 2019-08-08 PROCEDURE — 84484 ASSAY OF TROPONIN QUANT: CPT | Performed by: HOSPITALIST

## 2019-08-08 PROCEDURE — 88312 SPECIAL STAINS GROUP 1: CPT | Performed by: INTERNAL MEDICINE

## 2019-08-08 PROCEDURE — C9113 INJ PANTOPRAZOLE SODIUM, VIA: HCPCS | Performed by: INTERNAL MEDICINE

## 2019-08-08 PROCEDURE — 82962 GLUCOSE BLOOD TEST: CPT

## 2019-08-08 PROCEDURE — 85610 PROTHROMBIN TIME: CPT | Performed by: INTERNAL MEDICINE

## 2019-08-08 PROCEDURE — 83735 ASSAY OF MAGNESIUM: CPT | Performed by: INTERNAL MEDICINE

## 2019-08-08 RX ORDER — SODIUM CHLORIDE 9 MG/ML
INJECTION, SOLUTION INTRAVENOUS ONCE
Status: COMPLETED | OUTPATIENT
Start: 2019-08-08 | End: 2019-08-08

## 2019-08-08 RX ORDER — POTASSIUM CHLORIDE 14.9 MG/ML
20 INJECTION INTRAVENOUS ONCE
Status: COMPLETED | OUTPATIENT
Start: 2019-08-08 | End: 2019-08-08

## 2019-08-08 RX ORDER — HYDROMORPHONE HYDROCHLORIDE 1 MG/ML
0.6 INJECTION, SOLUTION INTRAMUSCULAR; INTRAVENOUS; SUBCUTANEOUS EVERY 5 MIN PRN
Status: DISCONTINUED | OUTPATIENT
Start: 2019-08-08 | End: 2019-08-08 | Stop reason: HOSPADM

## 2019-08-08 RX ORDER — LIDOCAINE HYDROCHLORIDE 10 MG/ML
INJECTION, SOLUTION EPIDURAL; INFILTRATION; INTRACAUDAL; PERINEURAL AS NEEDED
Status: DISCONTINUED | OUTPATIENT
Start: 2019-08-08 | End: 2019-08-08 | Stop reason: SURG

## 2019-08-08 RX ORDER — HYDROMORPHONE HYDROCHLORIDE 1 MG/ML
0.2 INJECTION, SOLUTION INTRAMUSCULAR; INTRAVENOUS; SUBCUTANEOUS EVERY 5 MIN PRN
Status: DISCONTINUED | OUTPATIENT
Start: 2019-08-08 | End: 2019-08-08 | Stop reason: HOSPADM

## 2019-08-08 RX ORDER — HALOPERIDOL 5 MG/ML
0.25 INJECTION INTRAMUSCULAR ONCE AS NEEDED
Status: DISCONTINUED | OUTPATIENT
Start: 2019-08-08 | End: 2019-08-08 | Stop reason: HOSPADM

## 2019-08-08 RX ORDER — MORPHINE SULFATE 4 MG/ML
2 INJECTION, SOLUTION INTRAMUSCULAR; INTRAVENOUS EVERY 10 MIN PRN
Status: DISCONTINUED | OUTPATIENT
Start: 2019-08-08 | End: 2019-08-08 | Stop reason: HOSPADM

## 2019-08-08 RX ORDER — MAGNESIUM HYDROXIDE/ALUMINUM HYDROXICE/SIMETHICONE 120; 1200; 1200 MG/30ML; MG/30ML; MG/30ML
30 SUSPENSION ORAL 4 TIMES DAILY PRN
Status: DISCONTINUED | OUTPATIENT
Start: 2019-08-08 | End: 2019-08-10

## 2019-08-08 RX ORDER — PROCHLORPERAZINE EDISYLATE 5 MG/ML
5 INJECTION INTRAMUSCULAR; INTRAVENOUS ONCE AS NEEDED
Status: DISCONTINUED | OUTPATIENT
Start: 2019-08-08 | End: 2019-08-08 | Stop reason: HOSPADM

## 2019-08-08 RX ORDER — DEXTROSE MONOHYDRATE 25 G/50ML
50 INJECTION, SOLUTION INTRAVENOUS
Status: DISCONTINUED | OUTPATIENT
Start: 2019-08-08 | End: 2019-08-08 | Stop reason: HOSPADM

## 2019-08-08 RX ORDER — NALOXONE HYDROCHLORIDE 0.4 MG/ML
80 INJECTION, SOLUTION INTRAMUSCULAR; INTRAVENOUS; SUBCUTANEOUS AS NEEDED
Status: DISCONTINUED | OUTPATIENT
Start: 2019-08-08 | End: 2019-08-08 | Stop reason: HOSPADM

## 2019-08-08 RX ORDER — IPRATROPIUM BROMIDE AND ALBUTEROL SULFATE 2.5; .5 MG/3ML; MG/3ML
3 SOLUTION RESPIRATORY (INHALATION) EVERY 4 HOURS PRN
Status: DISCONTINUED | OUTPATIENT
Start: 2019-08-08 | End: 2019-08-10

## 2019-08-08 RX ORDER — SODIUM CHLORIDE 9 MG/ML
INJECTION, SOLUTION INTRAVENOUS CONTINUOUS PRN
Status: DISCONTINUED | OUTPATIENT
Start: 2019-08-08 | End: 2019-08-08 | Stop reason: SURG

## 2019-08-08 RX ORDER — SODIUM CHLORIDE 0.9 % (FLUSH) 0.9 %
10 SYRINGE (ML) INJECTION AS NEEDED
Status: DISCONTINUED | OUTPATIENT
Start: 2019-08-08 | End: 2019-08-10

## 2019-08-08 RX ORDER — MORPHINE SULFATE 4 MG/ML
4 INJECTION, SOLUTION INTRAMUSCULAR; INTRAVENOUS EVERY 10 MIN PRN
Status: DISCONTINUED | OUTPATIENT
Start: 2019-08-08 | End: 2019-08-08 | Stop reason: HOSPADM

## 2019-08-08 RX ORDER — ONDANSETRON 2 MG/ML
4 INJECTION INTRAMUSCULAR; INTRAVENOUS ONCE AS NEEDED
Status: DISCONTINUED | OUTPATIENT
Start: 2019-08-08 | End: 2019-08-08 | Stop reason: HOSPADM

## 2019-08-08 RX ORDER — FUROSEMIDE 10 MG/ML
20 INJECTION INTRAMUSCULAR; INTRAVENOUS ONCE
Status: COMPLETED | OUTPATIENT
Start: 2019-08-08 | End: 2019-08-08

## 2019-08-08 RX ORDER — HYDROMORPHONE HYDROCHLORIDE 1 MG/ML
0.4 INJECTION, SOLUTION INTRAMUSCULAR; INTRAVENOUS; SUBCUTANEOUS EVERY 5 MIN PRN
Status: DISCONTINUED | OUTPATIENT
Start: 2019-08-08 | End: 2019-08-08 | Stop reason: HOSPADM

## 2019-08-08 RX ORDER — SODIUM CHLORIDE, SODIUM LACTATE, POTASSIUM CHLORIDE, CALCIUM CHLORIDE 600; 310; 30; 20 MG/100ML; MG/100ML; MG/100ML; MG/100ML
INJECTION, SOLUTION INTRAVENOUS CONTINUOUS
Status: DISCONTINUED | OUTPATIENT
Start: 2019-08-08 | End: 2019-08-09

## 2019-08-08 RX ORDER — GABAPENTIN 100 MG/1
200 CAPSULE ORAL DAILY
Status: DISCONTINUED | OUTPATIENT
Start: 2019-08-08 | End: 2019-08-09

## 2019-08-08 RX ORDER — MORPHINE SULFATE 10 MG/ML
6 INJECTION, SOLUTION INTRAMUSCULAR; INTRAVENOUS EVERY 10 MIN PRN
Status: DISCONTINUED | OUTPATIENT
Start: 2019-08-08 | End: 2019-08-08 | Stop reason: HOSPADM

## 2019-08-08 RX ORDER — HYDROCODONE BITARTRATE AND ACETAMINOPHEN 5; 325 MG/1; MG/1
2 TABLET ORAL AS NEEDED
Status: DISCONTINUED | OUTPATIENT
Start: 2019-08-08 | End: 2019-08-08 | Stop reason: HOSPADM

## 2019-08-08 RX ORDER — HYDROCODONE BITARTRATE AND ACETAMINOPHEN 5; 325 MG/1; MG/1
1 TABLET ORAL AS NEEDED
Status: DISCONTINUED | OUTPATIENT
Start: 2019-08-08 | End: 2019-08-08 | Stop reason: HOSPADM

## 2019-08-08 RX ADMIN — SODIUM CHLORIDE: 9 INJECTION, SOLUTION INTRAVENOUS at 13:00:00

## 2019-08-08 RX ADMIN — LIDOCAINE HYDROCHLORIDE 25 MG: 10 INJECTION, SOLUTION EPIDURAL; INFILTRATION; INTRACAUDAL; PERINEURAL at 13:02:00

## 2019-08-08 RX ADMIN — SODIUM CHLORIDE: 9 INJECTION, SOLUTION INTRAVENOUS at 13:33:00

## 2019-08-08 NOTE — PLAN OF CARE
Problem: PAIN - ADULT  Goal: Verbalizes/displays adequate comfort level or patient's stated pain goal  Description  INTERVENTIONS:  - Encourage pt to monitor pain and request assistance  - Assess pain using appropriate pain scale  - Administer analgesics hypoglycemia  - Administer ordered medications to maintain glucose within target range  - Assess barriers to adequate nutritional intake and initiate nutrition consult as needed  - Instruct patient on self management of diabetes  Outcome: Not Progressing

## 2019-08-08 NOTE — PHYSICAL THERAPY NOTE
Orders for PT evaluation received. Collaborated with RN-pt to have EGD today and to receive blood due to low Hgb (6.7). Will defer PT evaluation for today and follow-up tomorrow.

## 2019-08-08 NOTE — SIGNIFICANT EVENT
ICU nocturnist    RRT called to room 522.     Patient is an 43-year-old  female who presented with a low hemoglobin was transfused 2 units packed RBC now complaining of substernal chest discomfort which she describes as a lump-like feeling in the m

## 2019-08-08 NOTE — PLAN OF CARE
Problem: Patient Centered Care  Goal: Patient preferences are identified and integrated in the patient's plan of care  Description  Interventions:  - What would you like us to know as we care for you?  \"I just want my blood thicker\"  - Provide timely, c Verbalizes/displays adequate comfort level or patient's stated pain goal  Description  INTERVENTIONS:  - Encourage pt to monitor pain and request assistance  - Assess pain using appropriate pain scale  - Administer analgesics based on type and severity of stability  Description  INTERVENTIONS  - Assess for signs and symptoms of bleeding or hemorrhage  - Monitor labs and vital signs for trends  - Administer supportive blood products/factors, fluids and medications as ordered and appropriate  - Administer sup

## 2019-08-08 NOTE — INTERVAL H&P NOTE
Pre-op Diagnosis: GI bleed, Anemia    The above referenced H&P was reviewed by Francisca Joseph MD on 8/8/2019, the patient was examined and no significant changes have occurred in the patient's condition since the H&P was performed.   I discussed with the patie

## 2019-08-08 NOTE — PROGRESS NOTES
[facility]    Progress Note    Glorious Don Loss Patient Status:  Inpatient    1930 MRN [id number]   Location [facility]5W Attending Joe Melton MD   Hosp Day # 1 PCP [facility] AT [place]        Subjective:     Constitutional: Positive Electronically signed on 08/08/2019 at 07:18 by Tj Waller DO    Ekg 12-lead    Result Date: 8/7/2019  ECG Report  Interpretation  -------------------------- Electronic atrial pacemaker Low voltage in precordial leads.  -Poor R-wave progression -may be

## 2019-08-08 NOTE — PAYOR COMM NOTE
--------------  ADMISSION REVIEW     Payor: Vesna Fredo #:  [id number]  Authorization Number: [id number]    Admit date: 8/7/19  Admit time: 56       Admitting Physician: Dari Mcfadden MD  Attending Physician:  Frida Mejia MD  P Performed by Igor Saldaña MD at [facility] ENDOSCOPY     Medications :   glimepiride 4 MG Oral Tab,  TAKE 1 TABLET EVERY MORNINGBEFORE BREAKFAST   gabapentin 600 MG Oral Tab,  Take 1 tablet (600 mg total) by mouth 3 (three) times daily.    Insulin Degludec (T Glucose Blood (ONETOUCH VERIO) In Vitro Strip,  Use to check sugars 2 times daily   rivaroxaban 10 MG Oral Tab,  Take 1 tablet (10 mg total) by mouth daily with food. Patient taking differently: Take 20 mg by mouth nightly.      guaiFENesin 100mg/5ml,  [facility] fluids    Hemoglobin was 5.3. We also gave her a unit of packed red blood cells. Reexamination she appears comfortable in no obvious distress I did recommend admission.   I have paged Dr. Tovar Pore the gastroenterologist.  He is apparently not working anymor anemia  Fatigue, unspecified type    Disposition:  Admit    Present on Admission  Date Reviewed: 5/22/2019          ICD-10-CM Noted POA    Acute GI bleeding K92.2 8/7/2019 Unknown     Alejandro Alexander MD on 8/7/2019  2:43 PM         H&P signed by Richard Nash, house    Patient and/or patient's family given opportunity to ask questions and note understanding and agreeing with therapeutic plan as outlined    Thomas Connor MD  [facility] Hospitalist  Answering Service number: [phone]    HPI       History of Present Ill daily. Disp: 30 tablet Rfl: 1   amiodarone HCl 200 MG Oral Tab Take 1 tablet (200 mg total) by mouth daily. Disp: 30 tablet Rfl: 1   bisacodyl 5 MG Oral Tab EC Take 1 tablet (5 mg total) by mouth daily as needed for constipation.  Disp: 30 tablet Rfl: 1   C GFRAA 40*   GFRNAA 35*   CA 8.5      K 4.2      CO2 28.0     Lab Results   Component Value Date    WBC 4.7 08/07/2019    HGB 5.3 08/07/2019    HCT 17.8 08/07/2019    .0 08/07/2019    CREATSERUM 1.35 08/07/2019    BUN 47 08/07/2019    N tab 20 mg     Date Action Dose Route User    8/7/2019 2122 Given 20 mg Oral Kary Givens, RN      furosemide (LASIX) injection 20 mg     Date Action Dose Route User    8/8/2019 1141 Given 20 mg Intravenous Ethelene Estimable, RN      gabapentin (NEURONTIN) tab INPT ADMISSION

## 2019-08-08 NOTE — OCCUPATIONAL THERAPY NOTE
OT orders received. Pt w/ Hgb of 6.7 and receiving blood. Egd pending.  Will complete eval when appropriate

## 2019-08-08 NOTE — ANESTHESIA PREPROCEDURE EVALUATION
Anesthesia PreOp Note    HPI:     Orestes Damon is a 80year old female who presents for preoperative consultation requested by: Kranthi Gonzalez MD    Date of Surgery: 8/7/2019 - 8/8/2019    Procedure(s):  ESOPHAGOGASTRODUODENOSCOPY (EGD)  Indication: GI blee Sadia Roth MD at [facility] ENDOSCOPY   • ESOPHAGOGASTRODUODENOSCOPY (EGD) N/A 1/7/2018    Performed by Sofya No MD at [facility] ENDOSCOPY   • HYSTERECTOMY     • OTHER      2 cardiac stents   • OTHER      left shoulder sx approx 6/27/19         Medications Shreya total) by mouth 2 (two) times daily. (Patient taking differently: Take 100 mg by mouth 2 (two) times daily as needed.  ) Disp: 60 capsule Rfl: 1 8/7/2019 at Unknown time   furosemide 40 MG Oral Tab Take 1 tablet (40 mg total) by mouth daily.  (Patient arnol MD Alisa    ondansetron HCl (ZOFRAN) injection 4 mg 4 mg Intravenous Q6H PRN Tomas Guerrero MD    Metoclopramide HCl (REGLAN) injection 5 mg 5 mg Intravenous Q8H PRN Tomas Guerrero, MD    amiodarone HCl (PACERONE) tab 200 mg 200 mg Oral Daily Tomas Guerrero MD 20 Transportation needs:        Medical: Not on file        Non-medical: Not on file    Tobacco Use      Smoking status: Former Smoker      Smokeless tobacco: Never Used    Substance and Sexual Activity      Alcohol use: No      Drug use: No      Sexual activ 156/57 and her pulse is 65. Her respiration is 24 and oxygen saturation is 100%.     08/08/19  0912 08/08/19  1010 08/08/19  1234 08/08/19  1240   BP: 136/44 132/43 156/57    Pulse: 68 61 65    Resp: 18 16 24    Temp: 98.2 °F (36.8 °C) 98.4 °F (36.9 °C)

## 2019-08-08 NOTE — PLAN OF CARE
RRT    *See RRT Documentation Record*    Reason the RRT was called: at 0143  Assessment of patient leading up to RRT: pt c/o mid chest heaviness x 10 mins with sob at rest. rr increased. Pt is pale but not clammy, skin warm and dry.   Interventions/Testing:

## 2019-08-08 NOTE — PROGRESS NOTES
[facility] Hospitalist Progress Note     Reason for Admission: severe anemia  PCP: Quintin Abrams     Assessment/Plan:     Principal Problem:    Acute GI bleeding  Active Problems:    Severe anemia    Fatigue, unspecified type  Ms. Zepeda is a 80year old female with temperature 98.4 °F (36.9 °C), temperature source Oral, resp. rate 16, weight 195 lb 1.7 oz (88.5 kg), SpO2 100 %.     Temp:  [96.5 °F (35.8 °C)-98.6 °F (37 °C)] 98.4 °F (36.9 °C)  Pulse:  [59-78] 61  Resp:  [16-26] 16  BP: (116144)/(3883) 132/43      Int Normal Saline Flush, Alum & Mag Hydroxide-Simeth, Normal Saline Flush, Normal Saline Flush, ondansetron HCl, Metoclopramide HCl, dextrose, Glucose-Vitamin C, glucose, acetaminophen

## 2019-08-08 NOTE — PLAN OF CARE
Chest Pain    Patient complained of chest pain at 0140  Oxygen in use, pt uses o2 at hs  Stat EKG done at 0150   Sublingual Nitroglycerine administered: no   MD notified at [address]  Name of MD notified: kim chang (dr. Fran Rodríguez)

## 2019-08-08 NOTE — OPERATIVE REPORT
ENDOSCOPY OPERATIVE REPORT    Patient Name: JOSEPH Zepeda 46 Record #: [id number]  YOB: 1930  Date of Procedure: 8/8/2019    Preoperative Diagnosis: severe recurrent anemia; history of gastric and duodenal AVM's.    Postoperative Diagn biopsies were obtained. The procedure was tolerated well and upon completion and throughtout the vital signs were stable. COMPLICATIONS: None. PLAN: 1.) clear liquid diet. 2.) recommend capsule endoscopy. Mariela Brooks MD  [facility] Gastroenterology.

## 2019-08-08 NOTE — ANESTHESIA POSTPROCEDURE EVALUATION
Patient: Meliton Shown Loss    Procedure Summary     Date:  08/08/19 Room / Location:  [address] [place] [address] ENDOSCOPY 05 / [address] [place] [address] ENDOSCOPY    Anesthesia Start:  1300 Anesthesia Stop:      Procedure:  ESOPHAGOGASTRODUODENOSCOPY (EGD) (N/A ) Diagnosis:  (Gastritis, Jejunum AVM)

## 2019-08-09 ENCOUNTER — TELEPHONE (OUTPATIENT)
Dept: CARDIOLOGY | Age: 84
End: 2019-08-09

## 2019-08-09 LAB
ANION GAP SERPL CALC-SCNC: 6 MMOL/L (ref 0–18)
BLOOD TYPE BARCODE: 5100
BUN BLD-MCNC: 31 MG/DL (ref 7–18)
BUN/CREAT SERPL: 25.8 (ref 10–20)
CALCIUM BLD-MCNC: 8.7 MG/DL (ref 8.5–10.1)
CHLORIDE SERPL-SCNC: 108 MMOL/L (ref 98–112)
CO2 SERPL-SCNC: 31 MMOL/L (ref 21–32)
CREAT BLD-MCNC: 1.2 MG/DL (ref 0.55–1.02)
DEPRECATED RDW RBC AUTO: 55.9 FL (ref 35.1–46.3)
ERYTHROCYTE [DISTWIDTH] IN BLOOD BY AUTOMATED COUNT: 16.6 % (ref 11–15)
GLUCOSE BLD-MCNC: 75 MG/DL (ref 70–99)
GLUCOSE BLDC GLUCOMTR-MCNC: 121 MG/DL (ref 70–99)
GLUCOSE BLDC GLUCOMTR-MCNC: 202 MG/DL (ref 70–99)
GLUCOSE BLDC GLUCOMTR-MCNC: 225 MG/DL (ref 70–99)
GLUCOSE BLDC GLUCOMTR-MCNC: 82 MG/DL (ref 70–99)
HAV IGM SER QL: 2.1 MG/DL (ref 1.6–2.6)
HCT VFR BLD AUTO: 26.7 % (ref 35–48)
HGB BLD-MCNC: 8.2 G/DL (ref 12–16)
MCH RBC QN AUTO: 28.2 PG (ref 26–34)
MCHC RBC AUTO-ENTMCNC: 30.7 G/DL (ref 31–37)
MCV RBC AUTO: 91.8 FL (ref 80–100)
OSMOLALITY SERPL CALC.SUM OF ELEC: 305 MOSM/KG (ref 275–295)
PLATELET # BLD AUTO: 173 10(3)UL (ref 150–450)
POTASSIUM SERPL-SCNC: 4.2 MMOL/L (ref 3.5–5.1)
RBC # BLD AUTO: 2.91 X10(6)UL (ref 3.8–5.3)
SODIUM SERPL-SCNC: 145 MMOL/L (ref 136–145)
WBC # BLD AUTO: 6.2 X10(3) UL (ref 4–11)

## 2019-08-09 PROCEDURE — 97166 OT EVAL MOD COMPLEX 45 MIN: CPT

## 2019-08-09 PROCEDURE — 80048 BASIC METABOLIC PNL TOTAL CA: CPT | Performed by: INTERNAL MEDICINE

## 2019-08-09 PROCEDURE — 99232 SBSQ HOSP IP/OBS MODERATE 35: CPT | Performed by: NURSE PRACTITIONER

## 2019-08-09 PROCEDURE — 97116 GAIT TRAINING THERAPY: CPT

## 2019-08-09 PROCEDURE — 97535 SELF CARE MNGMENT TRAINING: CPT

## 2019-08-09 PROCEDURE — 94640 AIRWAY INHALATION TREATMENT: CPT

## 2019-08-09 PROCEDURE — 82962 GLUCOSE BLOOD TEST: CPT

## 2019-08-09 PROCEDURE — C9113 INJ PANTOPRAZOLE SODIUM, VIA: HCPCS | Performed by: INTERNAL MEDICINE

## 2019-08-09 PROCEDURE — 85027 COMPLETE CBC AUTOMATED: CPT | Performed by: INTERNAL MEDICINE

## 2019-08-09 PROCEDURE — 97530 THERAPEUTIC ACTIVITIES: CPT

## 2019-08-09 PROCEDURE — 97162 PT EVAL MOD COMPLEX 30 MIN: CPT

## 2019-08-09 RX ORDER — GABAPENTIN 100 MG/1
200 CAPSULE ORAL 2 TIMES DAILY
Status: DISCONTINUED | OUTPATIENT
Start: 2019-08-09 | End: 2019-08-10

## 2019-08-09 NOTE — PROGRESS NOTES
GI  PROGRESS NOTE    SUBJECTIVE:      OBJECTIVE:  Temp:  [98 °F (36.7 °C)-99 °F (37.2 °C)] 98.4 °F (36.9 °C)  Pulse:  [61-86] 68  Resp:  [16-24] 18  BP: (112-156)/(31-66) 136/31  Exam  Gen: No acute distress, alert and oriented x3  Pulm: Lungs clear, nor w/u if pt is not resuming anticoagulation.     3.) d/c PPI     Nava Miller MD  [facility] Gastroenterology   _______________________________________________________________

## 2019-08-09 NOTE — PROGRESS NOTES
[facility]    Progress Note    Verla Fuse Loss Patient Status:  Inpatient    1930 MRN [id number]   Location [facility]5W Attending Papa Watkins MD   Hosp Day # 2 PCP [facility] AT [place]        Subjective:     Constitutional: Positive Sinus Rhythm Low voltage in precordial leads.  -Old anterior infarct.  - Nonspecific T-abnormality.  ABNORMAL When compared with ECG of 08/07/2019 14:50:37 Sinus rhythm Electronically signed on 08/08/2019 at 07:18 by Tj Storey DO    Ekg 12-lead    Resu

## 2019-08-09 NOTE — CM/SW NOTE
MDO HH dc plan. CM Met with patient and family at bedside. Patient is current with [facility] and wants to resume with this provider. Resume orders for [facility].       [employer]/ECIN referral sent to [facility], with tentative discharge d

## 2019-08-09 NOTE — SPIRITUAL CARE NOTE
Pt was in bed and two of her dtr’s at bedside. Pt was overwhelmed stating that she is going to swallow a camera to take picture in her stomach. Pt has the history of bleeding and diabetes. She is here for blood transfusion.  The two dtrs were her source of

## 2019-08-09 NOTE — PROGRESS NOTES
[facility] Hospitalist Progress Note     Reason for Admission: severe anemia  PCP: Gio Kim     Assessment/Plan:     Principal Problem:    Acute GI bleeding  Active Problems:    Severe anemia    Fatigue, unspecified type  Ms. Zepeda is a 80year old female with source Oral, resp. rate 18, height 5' 2\" (1.575 m), weight 195 lb 1.7 oz (88.5 kg), SpO2 100 %.     Temp:  [98.4 °F (36.9 °C)-99 °F (37.2 °C)] 98.4 °F (36.9 °C)  Pulse:  [64-69] 68  Resp:  [18-20] 18  BP: (112-136)/(31-34) 136/31      Intake/Output:    Int insulin detemir  18 Units Subcutaneous Nightly   • Insulin Aspart Pen  1-7 Units Subcutaneous TID CC       Normal Saline Flush, Alum & Mag Hydroxide-Simeth, ipratropium-albuterol, Normal Saline Flush, Normal Saline Flush, ondansetron HCl, Metoclopramide HC

## 2019-08-09 NOTE — PROGRESS NOTES
Pt IV leaking, encouraged pt to let staff place another IV. Pt refusing IV access. MD aware. Old IV remains in.

## 2019-08-09 NOTE — PLAN OF CARE
Problem: PAIN - ADULT  Goal: Verbalizes/displays adequate comfort level or patient's stated pain goal  Description  INTERVENTIONS:  - Encourage pt to monitor pain and request assistance  - Assess pain using appropriate pain scale  - Administer analgesics Administer supportive blood products/factors, fluids and medications as ordered and appropriate  - Administer supportive blood products/factors as ordered and appropriate  Outcome: Progressing  HGB LEVEL IMPROVED, LATEST RESULT 9.2  Goal: Free from bleedin

## 2019-08-09 NOTE — CM/SW NOTE
08/09/19 1500   CM/SW Referral Data   Referral Source Physician;Nurse;Patient;    Patient Info   Patient's Mental Status Alert;Oriented   Patient's [address]   Number of Levels in Home 1   Patient lives with Alone   Patient Statu

## 2019-08-09 NOTE — OCCUPATIONAL THERAPY NOTE
OCCUPATIONAL THERAPY EVALUATION - INPATIENT     Room Number: 522/522-A  Evaluation Date: 8/9/2019  Type of Evaluation: Initial  Presenting Problem: GI bleed.  S/P EGD    Physician Order: IP Consult to Occupational Therapy  Reason for Therapy: ADL/IADL Dysfu Acute GI bleeding  Active Problems:    Severe anemia    Fatigue, unspecified type      Past Medical History  Past Medical History:   Diagnosis Date   • A-fib ([facility].)    • Anemia    • Anxiety state    • Arrhythmia    • Arthritis    • Atherosclerosis of corona Risk: Standard fall risk    PAIN ASSESSMENT  Ratin          COGNITION  Alert, oriented x 3. Followed direction. Noted to have some difficulty providing accurate information regarding home and PLOF.        RANGE OF MOTION   Upper extremity ROM is within to get better and go home.      Patient will complete functional transfer with SBA  Comment:     Patient will complete toileting with SBA  Comment:     Patient will tolerate standing for 4 minutes in prep for adls with SBA   Comment:    Patient will complet

## 2019-08-09 NOTE — PLAN OF CARE
Problem: Patient Centered Care  Goal: Patient preferences are identified and integrated in the patient's plan of care  Description  Interventions:  - What would you like us to know as we care for you?  \"I just want my blood thicker\"  - Provide timely, c evaluate response  - Implement non-pharmacological measures as appropriate and evaluate response  - Consider cultural and social influences on pain and pain management  - Manage/alleviate anxiety  - Utilize distraction and/or relaxation techniques  - Monit platelets)  INTERVENTIONS:  - Avoid intramuscular injections, enemas and rectal medication administration  - Ensure safe mobilization of patient  - Hold pressure on venipuncture sites to achieve adequate hemostasis  - Assess for signs and symptoms of inter

## 2019-08-09 NOTE — PHYSICAL THERAPY NOTE
PHYSICAL THERAPY EVALUATION - INPATIENT     Room Number: 522/522-A  Evaluation Date: 8/9/2019  Type of Evaluation: Initial   Physician Order: PT Eval and Treat    Presenting Problem: Acute GI bleed; severe anemia; fatigue  Reason for Therapy: Mobility Dys PHYSICAL THERAPY MEDICAL/SOCIAL HISTORY     History related to current admission: Per H&P: \"Ms. Zepeda is a 80year old female with PMH of IDDM, CAD, COPD, a fib s/p PPM on xarelto, HTN who presents from home with low Hgb.  Patient states that her stool ESOPHAGOGASTRODUODENOSCOPY (EGD) N/A 8/8/2019    Performed by Fabi Pulido MD at [address] [place] [address] ENDOSCOPY   • ESOPHAGOGASTRODUODENOSCOPY (EGD) N/A 3/9/2018    Performed by Francisca Marcano MD at [address] [place] [address] ENDOSCOPY   • ESOPHAGOGASTRODUODENOSCOPY (EGD) N/A 1/7/2018    Per standing up from a chair with arms (e.g., wheelchair, bedside commode, etc.): A Little   -   Moving from lying on back to sitting on the side of the bed?: A Little   How much help from another person does the patient currently need. ..   -   Moving to and f

## 2019-08-10 VITALS
HEART RATE: 63 BPM | RESPIRATION RATE: 18 BRPM | HEIGHT: 62 IN | TEMPERATURE: 97 F | OXYGEN SATURATION: 99 % | SYSTOLIC BLOOD PRESSURE: 117 MMHG | DIASTOLIC BLOOD PRESSURE: 73 MMHG | WEIGHT: 195.13 LBS | BODY MASS INDEX: 35.91 KG/M2

## 2019-08-10 LAB
ABSOLUTE IMMATURE GRANULOCYTES (OFFPRE24): NORMAL
ANION GAP SERPL CALC-SCNC: 6 MMOL/L
ANION GAP SERPL CALC-SCNC: 6 MMOL/L (ref 0–18)
BASO+EOS+MONOS # BLD: NORMAL 10*3/UL
BASO+EOS+MONOS NFR BLD: NORMAL %
BASOPHILS # BLD: NORMAL 10*3/UL
BASOPHILS NFR BLD: NORMAL %
BUN BLD-MCNC: 35 MG/DL (ref 7–18)
BUN SERPL-MCNC: 35 MG/DL
BUN/CREAT SERPL: 28.2
BUN/CREAT SERPL: 28.2 (ref 10–20)
CALCIUM BLD-MCNC: 8.2 MG/DL (ref 8.5–10.1)
CALCIUM SERPL-MCNC: 8.2 MG/DL
CHLORIDE SERPL-SCNC: 105 MMOL/L
CHLORIDE SERPL-SCNC: 105 MMOL/L (ref 98–112)
CO2 SERPL-SCNC: 31 MMOL/L
CO2 SERPL-SCNC: 31 MMOL/L (ref 21–32)
CREAT BLD-MCNC: 1.24 MG/DL (ref 0.55–1.02)
CREAT SERPL-MCNC: 1.24 MG/DL
DEPRECATED RDW RBC AUTO: 53.7 FL (ref 35.1–46.3)
DIFFERENTIAL METHOD BLD: NORMAL
EOSINOPHIL # BLD: NORMAL 10*3/UL
EOSINOPHIL NFR BLD: NORMAL %
ERYTHROCYTE [DISTWIDTH] IN BLOOD BY AUTOMATED COUNT: 16 % (ref 11–15)
ERYTHROCYTE [DISTWIDTH] IN BLOOD: NORMAL %
GLUCOSE BLD-MCNC: 216 MG/DL (ref 70–99)
GLUCOSE BLDC GLUCOMTR-MCNC: 184 MG/DL (ref 70–99)
GLUCOSE BLDC GLUCOMTR-MCNC: 226 MG/DL (ref 70–99)
GLUCOSE SERPL-MCNC: 216 MG/DL
HAV IGM SER QL: 1.9 MG/DL (ref 1.6–2.6)
HCT VFR BLD AUTO: 26.3 % (ref 35–48)
HCT VFR BLD CALC: 26.3 %
HGB BLD-MCNC: 8.1 G/DL
HGB BLD-MCNC: 8.1 G/DL (ref 12–16)
IMMATURE GRANULOCYTES (OFFPRE25): NORMAL
LENGTH OF FAST TIME PATIENT: NORMAL H
LYMPHOCYTES # BLD: NORMAL 10*3/UL
LYMPHOCYTES NFR BLD: NORMAL %
MCH RBC QN AUTO: 28 PG (ref 26–34)
MCH RBC QN AUTO: NORMAL PG
MCHC RBC AUTO-ENTMCNC: 30.8 G/DL (ref 31–37)
MCHC RBC AUTO-ENTMCNC: NORMAL G/DL
MCV RBC AUTO: 91 FL (ref 80–100)
MCV RBC AUTO: NORMAL FL
MONOCYTES # BLD: NORMAL 10*3/UL
MONOCYTES NFR BLD: NORMAL %
MPV (OFFPRE2): NORMAL
NEUTROPHILS # BLD: NORMAL 10*3/UL
NEUTROPHILS NFR BLD: NORMAL %
NRBC BLD MANUAL-RTO: NORMAL %
OSMOLALITY SERPL CALC.SUM OF ELEC: 309 MOSM/KG (ref 275–295)
PLAT MORPH BLD: NORMAL
PLATELET # BLD AUTO: 175 10(3)UL (ref 150–450)
PLATELET # BLD: NORMAL 10*3/UL
POTASSIUM SERPL-SCNC: 4 MMOL/L
POTASSIUM SERPL-SCNC: 4 MMOL/L (ref 3.5–5.1)
RBC # BLD AUTO: 2.89 X10(6)UL (ref 3.8–5.3)
RBC # BLD: 2.89 10*6/UL
RBC MORPH BLD: NORMAL
SODIUM SERPL-SCNC: 142 MMOL/L
SODIUM SERPL-SCNC: 142 MMOL/L (ref 136–145)
WBC # BLD AUTO: 4.9 X10(3) UL (ref 4–11)
WBC # BLD: 4.9 10*3/UL
WBC MORPH BLD: NORMAL

## 2019-08-10 PROCEDURE — 82962 GLUCOSE BLOOD TEST: CPT

## 2019-08-10 PROCEDURE — 80048 BASIC METABOLIC PNL TOTAL CA: CPT | Performed by: INTERNAL MEDICINE

## 2019-08-10 PROCEDURE — 83735 ASSAY OF MAGNESIUM: CPT | Performed by: HOSPITALIST

## 2019-08-10 PROCEDURE — 85027 COMPLETE CBC AUTOMATED: CPT | Performed by: INTERNAL MEDICINE

## 2019-08-10 RX ORDER — BISACODYL 10 MG
10 SUPPOSITORY, RECTAL RECTAL
Status: DISCONTINUED | OUTPATIENT
Start: 2019-08-10 | End: 2019-08-10

## 2019-08-10 RX ORDER — DOCUSATE SODIUM 100 MG/1
100 CAPSULE, LIQUID FILLED ORAL 2 TIMES DAILY
Qty: 60 CAPSULE | Refills: 1 | Status: SHIPPED | OUTPATIENT
Start: 2019-08-10

## 2019-08-10 RX ORDER — POLYETHYLENE GLYCOL 3350 17 G/17G
17 POWDER, FOR SOLUTION ORAL DAILY PRN
Status: DISCONTINUED | OUTPATIENT
Start: 2019-08-10 | End: 2019-08-10

## 2019-08-10 RX ORDER — MELATONIN
325
Status: DISCONTINUED | OUTPATIENT
Start: 2019-08-10 | End: 2019-08-10

## 2019-08-10 NOTE — PROGRESS NOTES
GI  PROGRESS NOTE    SUBJECTIVE: having breakfast. No abd pain; no bm's overnight.        OBJECTIVE:  Temp:  [98 °F (36.7 °C)-98.9 °F (37.2 °C)] 98.9 °F (37.2 °C)  Pulse:  [63-69] 69  Resp:  [18] 18  BP: (117)/(32-34) 117/32  Exam  Gen: No acute distress, recommend STAT bleeding scan. Stable from GI standpoint; please call us as needed.    Jaymie Mcleod MD  [facility]   _______________________________________________________________

## 2019-08-10 NOTE — PLAN OF CARE
Problem: Patient Centered Care  Goal: Patient preferences are identified and integrated in the patient's plan of care  Description  Interventions:  - What would you like us to know as we care for you?  \"I just want my blood thicker\"  - Provide timely, c appropriate pain scale  - Administer analgesics based on type and severity of pain and evaluate response  - Implement non-pharmacological measures as appropriate and evaluate response  - Consider cultural and social influences on pain and pain management fluids and medications as ordered and appropriate  - Administer supportive blood products/factors as ordered and appropriate  Outcome: Progressing  Goal: Free from bleeding injury  Description  (Example usage: patient with low platelets)  INTERVENTIONS:  -

## 2019-08-10 NOTE — PLAN OF CARE
Problem: Patient Centered Care  Goal: Patient preferences are identified and integrated in the patient's plan of care  Description  Interventions:  - What would you like us to know as we care for you?  \"I just want my blood thicker\"  - Provide timely, c based on type and severity of pain and evaluate response  - Implement non-pharmacological measures as appropriate and evaluate response  - Consider cultural and social influences on pain and pain management  - Manage/alleviate anxiety  - Utilize distractio bleeding injury  Description  (Example usage: patient with low platelets)  INTERVENTIONS:  - Avoid intramuscular injections, enemas and rectal medication administration  - Ensure safe mobilization of patient  - Hold pressure on venipuncture sites to [employer]

## 2019-08-10 NOTE — DISCHARGE SUMMARY
General Medicine Discharge Summary     Patient ID:  Mushtaq Adair Loss  80year old  2/27/1930    Admit date: 8/7/2019    Discharge date and time: 8/10/19    Attending Physician: China James MD     Consults: IP CONSULT TO  [facility] Patient was admitted for likely UGIB January 2018 and also March 2018. 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  Xarelto dose was decreased to 10 mg daily but recently increased back to 20 mg daily after #Chronic Atrial Fibrillation:  -CHADS VASc 6 which equates to roughly 10% stroke risk per year   -continue home amiodarone  -[facility] consulted, keep off anticoagulation for now, currently in NSR may benefit from [facility] if recurrent a fib   -Cardiology ok wit Take 1 tablet (20 mg total) by mouth nightly. bisacodyl 5 MG Tbec  Commonly known as:  DULCOLAX  Take 1 tablet (5 mg total) by mouth daily as needed for constipation.      ferrous sulfate 325 (65 FE) MG Tbec  Take 1 tablet (325 mg total) by mouth 2 (two Charity Crain MD. Schedule an appointment as soon as possible for a visit in 1 week.     Specialty:  GASTROENTEROLOGY  Contact information:  [address] [facility] [address] [address]             Shayna Hart MD. Schedule an bisacodyl 5 MG Tbec  Commonly known as:  DULCOLAX  Take 1 tablet (5 mg total) by mouth daily as needed for constipation. ferrous sulfate 325 (65 FE) MG Tbec  Take 1 tablet (325 mg total) by mouth 2 (two) times daily with meals.      glimepiride 4 MG

## 2019-08-11 ENCOUNTER — TELEPHONE (OUTPATIENT)
Dept: MEDSURG UNIT | Facility: HOSPITAL | Age: 84
End: 2019-08-11

## 2019-08-12 ENCOUNTER — TELEPHONE (OUTPATIENT)
Dept: MEDSURG UNIT | Facility: HOSPITAL | Age: 84
End: 2019-08-12

## 2019-08-12 ENCOUNTER — PATIENT MESSAGE (OUTPATIENT)
Dept: ENDOCRINOLOGY CLINIC | Facility: CLINIC | Age: 84
End: 2019-08-12

## 2019-08-12 NOTE — PROGRESS NOTES
Here are the biopsy/pathology findings from your recent EGD (Upper  Endoscopy):stomach biopsies were negative for  H pylori bacteria. If you need any further assistance, please feel free to call [phone]. Thank you for letting us care for you .

## 2019-08-12 NOTE — PAYOR COMM NOTE
--------------  DISCHARGE REVIEW    Payor: Dyan Number #:  [id number]  Authorization Number: [id number]    Admit date: 8/7/19  Admit time:  1622  Discharge Date: 8/10/2019  1:42 PM     Admitting Physician: Paula Miller MD  Attend Abd: Abdomen soft,       HPI:   Per Dr. Yeison Shankar:  History of Present Illness:   Ms. Zaid Oropeza is a 80year old female with PMH of IDDM, CAD, COPD, a fib s/p PPM on xarelto, HTN who presents from home with low Hgb.  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 What changed:  how much to take     gabapentin 600 MG Tabs  Commonly known as:  NEURONTIN  Take 1 tablet (600 mg total) by mouth 3 (three) times daily.   What Commonly known as:  PRILOSEC     rivaroxaban 10 MG Tabs  Commonly known as:  Jade Delia           Where to Get Your Medications      You can get these medications from any pharmacy    Bring a paper prescription for each of these medications  · docusate sodium What changed:  how much to take     gabapentin 600 MG Tabs  Commonly known as:  NEURONTIN  Take 1 tablet (600 mg total) by mouth 3 (three) times daily.   What changed:    · how much to take  · when to take this        CONTINUE taking these medications    al Where to Get Your Medications      You can get these medications from any pharmacy    Bring a paper prescription for each of these medications  · docusate sodium 100 MG Caps         Patient had opportunity to ask questions and state understand and agree wi

## 2019-08-12 NOTE — TELEPHONE ENCOUNTER
Dr. Herbie Roberts please see email. Would you like to see patient after hospital discharge for hospital follow up? If so when? Thanks. LOV 5/22/19 for DM2.

## 2019-08-12 NOTE — TELEPHONE ENCOUNTER
From: Madonna Langley Loss  To: Madelin Patrick MD  Sent: 8/12/2019 9:39 AM CDT  Subject: Non-Urgent Medical Question    Hi Dr. Lea Rice,    I had to cancel my Mom's follow-up appointment for today. She ended up at [facility] last Wednesday for low hemoglobin.

## 2019-08-15 NOTE — TELEPHONE ENCOUNTER
Please advise - BG log and regimen    Cancelled apt 8/12/19, would you like to see pt?     LOV 5/22/19 F/U 10/9/19

## 2019-08-19 PROBLEM — R53.83 FATIGUE: Status: ACTIVE | Noted: 2019-08-19

## 2019-08-19 PROBLEM — D64.9 ANEMIA: Status: ACTIVE | Noted: 2019-08-19

## 2019-08-19 PROBLEM — K92.2 ACUTE GI BLEEDING: Status: ACTIVE | Noted: 2019-08-19

## 2019-08-19 PROBLEM — K92.2 GI (GASTROINTESTINAL HEMORRHAGE): Status: ACTIVE | Noted: 2019-08-19

## 2019-08-19 RX ORDER — ALLOPURINOL 100 MG/1
100 TABLET ORAL DAILY
COMMUNITY
Start: 2008-05-01

## 2019-08-19 RX ORDER — ATORVASTATIN CALCIUM 20 MG/1
20 TABLET, FILM COATED ORAL NIGHTLY
COMMUNITY
Start: 2018-01-09 | End: 2019-11-04 | Stop reason: SDUPTHER

## 2019-08-19 RX ORDER — BISACODYL 5 MG/1
5 TABLET, DELAYED RELEASE ORAL PRN
COMMUNITY
Start: 2018-01-09

## 2019-08-19 RX ORDER — POLYETHYLENE GLYCOL 3350 17 G/17G
17 POWDER, FOR SOLUTION ORAL PRN
COMMUNITY
Start: 2018-01-09 | End: 2020-08-10 | Stop reason: ALTCHOICE

## 2019-08-19 RX ORDER — LANOLIN ALCOHOL/MO/W.PET/CERES
325 CREAM (GRAM) TOPICAL EVERY OTHER DAY
COMMUNITY
Start: 2018-01-09

## 2019-08-19 RX ORDER — GUAIFENESIN 200 MG/10ML
50 LIQUID ORAL 4 TIMES DAILY PRN
COMMUNITY

## 2019-08-19 RX ORDER — AMINO ACIDS/MV,IRON,MIN
1 TABLET ORAL DAILY
COMMUNITY

## 2019-08-19 RX ORDER — SENNOSIDES 8.6 MG
1 CAPSULE ORAL PRN
COMMUNITY

## 2019-08-19 RX ORDER — GABAPENTIN 600 MG/1
600 TABLET ORAL 2 TIMES DAILY
COMMUNITY
Start: 2019-06-26

## 2019-08-19 RX ORDER — AMIODARONE HYDROCHLORIDE 200 MG/1
200 TABLET ORAL DAILY
COMMUNITY
Start: 2018-01-09 | End: 2019-09-03 | Stop reason: SDUPTHER

## 2019-08-19 RX ORDER — DOCUSATE SODIUM 100 MG/1
100 CAPSULE, LIQUID FILLED ORAL 2 TIMES DAILY
COMMUNITY
Start: 2019-08-10

## 2019-08-19 RX ORDER — THIAMINE HCL 100 MG
2500 TABLET ORAL DAILY
COMMUNITY
Start: 2018-01-09

## 2019-08-19 RX ORDER — GLIMEPIRIDE 4 MG/1
1 TABLET ORAL DAILY
COMMUNITY
Start: 2008-05-01

## 2019-08-20 ENCOUNTER — TELEPHONE (OUTPATIENT)
Dept: CARDIOLOGY | Age: 84
End: 2019-08-20

## 2019-08-20 ENCOUNTER — OFFICE VISIT (OUTPATIENT)
Dept: CARDIOLOGY | Age: 84
End: 2019-08-20

## 2019-08-20 VITALS
DIASTOLIC BLOOD PRESSURE: 60 MMHG | HEART RATE: 72 BPM | SYSTOLIC BLOOD PRESSURE: 126 MMHG | WEIGHT: 189 LBS | HEIGHT: 61 IN | BODY MASS INDEX: 35.68 KG/M2

## 2019-08-20 DIAGNOSIS — E13.9 DIABETES 1.5, MANAGED AS TYPE 2 (CMD): ICD-10-CM

## 2019-08-20 DIAGNOSIS — K92.2 GASTROINTESTINAL HEMORRHAGE, UNSPECIFIED GASTROINTESTINAL HEMORRHAGE TYPE: Primary | ICD-10-CM

## 2019-08-20 DIAGNOSIS — I48.0 PAROXYSMAL ATRIAL FIBRILLATION (CMD): ICD-10-CM

## 2019-08-20 DIAGNOSIS — R00.1 BRADYCARDIA, UNSPECIFIED: ICD-10-CM

## 2019-08-20 DIAGNOSIS — I25.10 ATHEROSCLEROSIS OF NATIVE CORONARY ARTERY OF NATIVE HEART WITHOUT ANGINA PECTORIS: ICD-10-CM

## 2019-08-20 DIAGNOSIS — Z95.0 PRESENCE OF CARDIAC PACEMAKER: ICD-10-CM

## 2019-08-20 PROCEDURE — 99205 OFFICE O/P NEW HI 60 MIN: CPT | Performed by: INTERNAL MEDICINE

## 2019-08-20 SDOH — HEALTH STABILITY: MENTAL HEALTH: HOW OFTEN DO YOU HAVE A DRINK CONTAINING ALCOHOL?: NEVER

## 2019-08-22 ENCOUNTER — OFFICE VISIT (OUTPATIENT)
Dept: CARDIOLOGY | Age: 84
End: 2019-08-22

## 2019-08-22 VITALS
HEIGHT: 61 IN | DIASTOLIC BLOOD PRESSURE: 58 MMHG | WEIGHT: 190 LBS | BODY MASS INDEX: 35.87 KG/M2 | HEART RATE: 64 BPM | SYSTOLIC BLOOD PRESSURE: 124 MMHG

## 2019-08-22 DIAGNOSIS — Z95.0 PRESENCE OF CARDIAC PACEMAKER: Primary | ICD-10-CM

## 2019-08-22 DIAGNOSIS — I48.0 PAROXYSMAL ATRIAL FIBRILLATION (CMD): ICD-10-CM

## 2019-08-22 PROBLEM — E78.5 HYPERLIPIDEMIA: Status: ACTIVE | Noted: 2019-07-09

## 2019-08-22 PROBLEM — I25.10 ATHEROSCLEROSIS OF CORONARY ARTERY: Status: ACTIVE | Noted: 2019-07-09

## 2019-08-22 PROCEDURE — 99214 OFFICE O/P EST MOD 30 MIN: CPT | Performed by: INTERNAL MEDICINE

## 2019-08-22 SDOH — HEALTH STABILITY: MENTAL HEALTH: HOW OFTEN DO YOU HAVE A DRINK CONTAINING ALCOHOL?: NEVER

## 2019-08-22 ASSESSMENT — ENCOUNTER SYMPTOMS
HEMOPTYSIS: 0
FEVER: 0
HEMATOCHEZIA: 0
COUGH: 0
CHILLS: 0
ALLERGIC/IMMUNOLOGIC COMMENTS: NO NEW FOOD ALLERGIES
BRUISES/BLEEDS EASILY: 0
SUSPICIOUS LESIONS: 0
WEIGHT GAIN: 0
WEIGHT LOSS: 0

## 2019-08-29 RX ORDER — BLOOD SUGAR DIAGNOSTIC
STRIP MISCELLANEOUS
Qty: 200 STRIP | Refills: 1 | Status: SHIPPED | OUTPATIENT
Start: 2019-08-29 | End: 2020-03-18

## 2019-09-03 ENCOUNTER — TELEPHONE (OUTPATIENT)
Dept: CARDIOLOGY | Age: 84
End: 2019-09-03

## 2019-09-03 RX ORDER — AMIODARONE HYDROCHLORIDE 200 MG/1
TABLET ORAL
Qty: 30 TABLET | Refills: 0 | Status: SHIPPED | OUTPATIENT
Start: 2019-09-03 | End: 2019-09-28 | Stop reason: SDUPTHER

## 2019-09-13 NOTE — ADDENDUM NOTE
Addendum  created 08/08/19 1502 by Dolly Sandoval MD    Attestation recorded in [address] [place] [address], [facility] filed PRINCIPAL DISCHARGE DIAGNOSIS  Diagnosis: Overdose  Assessment and Plan of Treatment: Resolved.  Patient dced off of benzo medication.  Do not take xanax and valium as patient did not receve medication during hospital stay.      SECONDARY DISCHARGE DIAGNOSES  Diagnosis: Chronic anemia  Assessment and Plan of Treatment: Patient at baseline.  Recommend follow up with pmd in 2-4 weeks.    Diagnosis: Acidosis  Assessment and Plan of Treatment: resolved    Diagnosis: Hyperkalemia  Assessment and Plan of Treatment: resolved    Diagnosis: Acute on chronic renal failure  Assessment and Plan of Treatment: Nephrology follow up recommended.  Resolving. PRINCIPAL DISCHARGE DIAGNOSIS  Diagnosis: Overdose  Assessment and Plan of Treatment: Resolved.  Patient dced off of benzo medication.  Do not take xanax and valium as patient did not receve medication during hospital stay.      SECONDARY DISCHARGE DIAGNOSES  Diagnosis: UTI (urinary tract infection)  Assessment and Plan of Treatment: Continue with keflex @ 500mg daily for 4 more days.    Diagnosis: Chronic anemia  Assessment and Plan of Treatment: Patient at baseline.  Recommend follow up with pmd in 2-4 weeks.    Diagnosis: Acidosis  Assessment and Plan of Treatment: resolved    Diagnosis: Hyperkalemia  Assessment and Plan of Treatment: resolved    Diagnosis: Acute on chronic renal failure  Assessment and Plan of Treatment: Nephrology follow up recommended.  Resolving.

## 2019-09-24 ENCOUNTER — TELEPHONE (OUTPATIENT)
Dept: ENDOCRINOLOGY CLINIC | Facility: CLINIC | Age: 84
End: 2019-09-24

## 2019-09-24 NOTE — TELEPHONE ENCOUNTER
Dr. Sharonda Miramontes please review below documentation. OK to close encounter if no further advice. Dr. Sharonda Miramontes received a page to call patient for high blood sugar. Called and spoke with patient's two daughters: Linda Berger and Amari Whiting.      Current Regimen: Patien

## 2019-09-27 ENCOUNTER — NURSE ONLY (OUTPATIENT)
Dept: ENDOCRINOLOGY CLINIC | Facility: CLINIC | Age: 84
End: 2019-09-27
Payer: MEDICARE

## 2019-09-27 DIAGNOSIS — E11.65 UNCONTROLLED TYPE 2 DIABETES MELLITUS WITH HYPERGLYCEMIA (HCC): Primary | ICD-10-CM

## 2019-09-27 PROCEDURE — 99211 OFF/OP EST MAY X REQ PHY/QHP: CPT | Performed by: INTERNAL MEDICINE

## 2019-09-27 NOTE — PROGRESS NOTES
Kaya Zepeda was seen for review of glucose readings, medication review/education.   She is present with her daughter today.     Date: 9/27/2019  Start Time: 2:00: PM        End Time: 2:45: AM        HgA1c:12.4 % from 5/22/2019     Glucose readings-  Che

## 2019-09-30 RX ORDER — AMIODARONE HYDROCHLORIDE 200 MG/1
TABLET ORAL
Qty: 30 TABLET | Refills: 5 | Status: SHIPPED | OUTPATIENT
Start: 2019-09-30 | End: 2019-12-02 | Stop reason: DRUGHIGH

## 2019-10-14 ENCOUNTER — ANCILLARY PROCEDURE (OUTPATIENT)
Dept: CARDIOLOGY | Age: 84
End: 2019-10-14
Attending: INTERNAL MEDICINE

## 2019-10-14 VITALS
RESPIRATION RATE: 20 BRPM | WEIGHT: 194 LBS | HEART RATE: 70 BPM | SYSTOLIC BLOOD PRESSURE: 98 MMHG | DIASTOLIC BLOOD PRESSURE: 50 MMHG | BODY MASS INDEX: 36.66 KG/M2

## 2019-10-14 DIAGNOSIS — Z95.0 CARDIAC PACEMAKER: Primary | ICD-10-CM

## 2019-10-14 PROCEDURE — 93280 PM DEVICE PROGR EVAL DUAL: CPT | Performed by: INTERNAL MEDICINE

## 2019-10-22 NOTE — TELEPHONE ENCOUNTER
Patient sending BG readings - one low reading of 76 on 10/17. Patients daughter explained why in [address] [place] [address]. Pls advise.

## 2019-10-25 RX ORDER — INSULIN DEGLUDEC 200 U/ML
30 INJECTION, SOLUTION SUBCUTANEOUS DAILY
Qty: 27 ML | Refills: 0 | Status: SHIPPED | OUTPATIENT
Start: 2019-10-25 | End: 2019-11-06

## 2019-11-04 RX ORDER — FUROSEMIDE 40 MG/1
TABLET ORAL
Qty: 135 TABLET | Refills: 3 | Status: SHIPPED | OUTPATIENT
Start: 2019-11-04 | End: 2020-08-10 | Stop reason: DRUGHIGH

## 2019-11-04 RX ORDER — ATORVASTATIN CALCIUM 20 MG/1
TABLET, FILM COATED ORAL
Qty: 90 TABLET | Refills: 2 | Status: SHIPPED | OUTPATIENT
Start: 2019-11-04 | End: 2020-04-06 | Stop reason: SDUPTHER

## 2019-11-06 RX ORDER — INSULIN DEGLUDEC 200 U/ML
INJECTION, SOLUTION SUBCUTANEOUS
Qty: 18 ML | Refills: 0 | Status: SHIPPED | OUTPATIENT
Start: 2019-11-06 | End: 2020-04-13

## 2019-11-11 RX ORDER — GABAPENTIN 600 MG/1
600 TABLET ORAL 3 TIMES DAILY
Qty: 270 TABLET | Refills: 1 | Status: SHIPPED | OUTPATIENT
Start: 2019-11-11 | End: 2020-05-21

## 2019-11-11 NOTE — TELEPHONE ENCOUNTER
Current Outpatient Medications   Medication Sig Dispense Refill   • gabapentin 600 MG Oral Tab Take 1 tablet (600 mg total) by mouth 3 (three) times daily.  (Patient taking differently: Take 300 mg by mouth daily.  ) 270 tablet 1     Refill

## 2019-11-18 ENCOUNTER — APPOINTMENT (OUTPATIENT)
Dept: CARDIOLOGY | Age: 84
End: 2019-11-18

## 2019-12-02 ENCOUNTER — OFFICE VISIT (OUTPATIENT)
Dept: CARDIOLOGY | Age: 84
End: 2019-12-02

## 2019-12-02 ENCOUNTER — APPOINTMENT (OUTPATIENT)
Dept: CARDIOLOGY | Age: 84
End: 2019-12-02

## 2019-12-02 VITALS
BODY MASS INDEX: 35.7 KG/M2 | DIASTOLIC BLOOD PRESSURE: 64 MMHG | HEIGHT: 62 IN | WEIGHT: 194 LBS | HEART RATE: 64 BPM | SYSTOLIC BLOOD PRESSURE: 118 MMHG

## 2019-12-02 DIAGNOSIS — E78.00 PURE HYPERCHOLESTEROLEMIA: ICD-10-CM

## 2019-12-02 DIAGNOSIS — K92.2 GASTROINTESTINAL HEMORRHAGE, UNSPECIFIED GASTROINTESTINAL HEMORRHAGE TYPE: ICD-10-CM

## 2019-12-02 DIAGNOSIS — K92.2 ACUTE GI BLEEDING: ICD-10-CM

## 2019-12-02 DIAGNOSIS — I48.0 PAROXYSMAL ATRIAL FIBRILLATION (CMD): ICD-10-CM

## 2019-12-02 DIAGNOSIS — Z95.0 PRESENCE OF CARDIAC PACEMAKER: Primary | ICD-10-CM

## 2019-12-02 DIAGNOSIS — I25.10 ATHEROSCLEROSIS OF NATIVE CORONARY ARTERY OF NATIVE HEART WITHOUT ANGINA PECTORIS: ICD-10-CM

## 2019-12-02 PROCEDURE — 99214 OFFICE O/P EST MOD 30 MIN: CPT | Performed by: INTERNAL MEDICINE

## 2019-12-02 RX ORDER — AMIODARONE HYDROCHLORIDE 200 MG/1
200 TABLET ORAL EVERY OTHER DAY
Qty: 30 TABLET | Refills: 5 | Status: SHIPPED | OUTPATIENT
Start: 2019-12-02 | End: 2020-06-15

## 2019-12-02 ASSESSMENT — ENCOUNTER SYMPTOMS
WEIGHT GAIN: 0
SUSPICIOUS LESIONS: 0
CHILLS: 0
HEMOPTYSIS: 0
HEMATOCHEZIA: 0
FEVER: 0
BRUISES/BLEEDS EASILY: 0
WEIGHT LOSS: 0
COUGH: 0
ALLERGIC/IMMUNOLOGIC COMMENTS: NO NEW FOOD ALLERGIES

## 2019-12-02 ASSESSMENT — PATIENT HEALTH QUESTIONNAIRE - PHQ9
1. LITTLE INTEREST OR PLEASURE IN DOING THINGS: NOT AT ALL
SUM OF ALL RESPONSES TO PHQ9 QUESTIONS 1 AND 2: 0
SUM OF ALL RESPONSES TO PHQ9 QUESTIONS 1 AND 2: 0
2. FEELING DOWN, DEPRESSED OR HOPELESS: NOT AT ALL

## 2019-12-04 ENCOUNTER — OFFICE VISIT (OUTPATIENT)
Dept: ENDOCRINOLOGY CLINIC | Facility: CLINIC | Age: 84
End: 2019-12-04
Payer: MEDICARE

## 2019-12-04 VITALS
SYSTOLIC BLOOD PRESSURE: 123 MMHG | DIASTOLIC BLOOD PRESSURE: 76 MMHG | WEIGHT: 192.38 LBS | HEART RATE: 63 BPM | BODY MASS INDEX: 35 KG/M2

## 2019-12-04 DIAGNOSIS — E11.65 UNCONTROLLED TYPE 2 DIABETES MELLITUS WITH HYPERGLYCEMIA (HCC): Primary | ICD-10-CM

## 2019-12-04 PROCEDURE — 36416 COLLJ CAPILLARY BLOOD SPEC: CPT | Performed by: INTERNAL MEDICINE

## 2019-12-04 PROCEDURE — 83036 HEMOGLOBIN GLYCOSYLATED A1C: CPT | Performed by: INTERNAL MEDICINE

## 2019-12-04 PROCEDURE — 99214 OFFICE O/P EST MOD 30 MIN: CPT | Performed by: INTERNAL MEDICINE

## 2019-12-04 PROCEDURE — 82962 GLUCOSE BLOOD TEST: CPT | Performed by: INTERNAL MEDICINE

## 2019-12-04 RX ORDER — SEMAGLUTIDE 1.34 MG/ML
0.5 INJECTION, SOLUTION SUBCUTANEOUS WEEKLY
Qty: 1.5 ML | Refills: 2 | Status: SHIPPED | OUTPATIENT
Start: 2019-12-04 | End: 2020-03-11

## 2019-12-04 NOTE — PROGRESS NOTES
Name: Carina Zepeda  Date: 12/4/2019    Referring Physician: No ref. provider found    HISTORY OF PRESENT ILLNESS   Carina Zepeda is a 80year old female who presents for diabetes mellitus.      She was hospitalized in 11/2017 with severe RSV PNA requiring FLEXTOUCH 200 UNIT/ML Subcutaneous Solution Pen-injector, INJECT 30 UNITS            SUBCUTANEOUSLY DAILY (Patient taking differently: 25 Units.  ), Disp: 18 mL, Rfl: 0  •  Glucose Blood (ONETOUCH VERIO) In Vitro Strip, TEST TO CHECK BLOOD SUGAR TWICE COTY 17 g by mouth daily as needed (constipation). , Disp: 30 each, Rfl: 1  •  ferrous sulfate 325 (65 FE) MG Oral Tab EC, Take 1 tablet (325 mg total) by mouth 2 (two) times daily with meals. , Disp: 60 tablet, Rfl: 1  •  guaiFENesin 100mg/5ml, Take 50 mg by nash • CARDIAC PACEMAKER PLACEMENT     • COLONOSCOPY     • COLONOSCOPY N/A 1/8/2018    Performed by Shahab Brooks MD at [facility] ENDOSCOPY   • EGD  01/2018   • ESOPHAGOGASTRODUODENOSCOPY (EGD) N/A 8/8/2019    Performed by Alexandria Ramon MD at [facility] ENDOSCOPY   • ESO importance of SBGM - ok to decrease to 2 times per day  -She is very concerned about adding short acting insulin and confusing the two pens    -Will try adding weekly GLP-1 which can be given by her daughter   -Start Ozempic 0.5mg SQ weekly, verbalized und

## 2019-12-04 NOTE — PATIENT INSTRUCTIONS
Continue Glimepiride    Increase Tresiba 30 units SQ daily    Start Ozempic 0.25mg SQ weekly for one month then increase to 0.5mg SQ weekly

## 2020-01-01 NOTE — TELEPHONE ENCOUNTER
From: Grant Bird Loss  To:  Cleo Cardenas MD  Sent: 3/5/2019 5:37 PM CST  Subject: Prescription Question    Hello,    I am requesting a new prescription for the following:    BD Ultra-Fine Pen Needles, [place] Fit  [place] comfort  Angela 4mm x 32G   90 s
Pen needles sent per protocol. Pt made aware.
Per message from daughter, cancelled pen needles to [facility] and reordered to [facility]
Pts daughter Larry Farias called to request that RX be sent to [facility] , ph. [phone] for 3 mos supply.    Electronic RX # is [id number]
<<----- Click to add NO significant Past Surgical History

## 2020-01-02 ENCOUNTER — PATIENT MESSAGE (OUTPATIENT)
Dept: ENDOCRINOLOGY CLINIC | Facility: CLINIC | Age: 85
End: 2020-01-02

## 2020-01-02 NOTE — TELEPHONE ENCOUNTER
From: Zoila Lopes Loss  To: Sha Aden MD  Sent: 1/2/2020 9:06 AM CST  Subject: Prescription Question    Good morning Dr Uday Todd,   About a month ago you prescribed Ozempic for my mom. I have been unable to convince her to try it.    She is extremely afraid of

## 2020-01-10 NOTE — TELEPHONE ENCOUNTER
Her A1c is fairly high. If she isn't going to start medication then lets schedule RN visit in one month for A1c to make sure improving. If not then will need to start med. Thanks.

## 2020-01-20 PROCEDURE — 93294 REM INTERROG EVL PM/LDLS PM: CPT | Performed by: INTERNAL MEDICINE

## 2020-01-20 PROCEDURE — 93296 REM INTERROG EVL PM/IDS: CPT | Performed by: INTERNAL MEDICINE

## 2020-01-20 NOTE — TELEPHONE ENCOUNTER
Pt booked 2/12 for [place] to check A1C. Pen needles sent to wrong pharmacy. Replied to pt via [address] and resent pen needles to correct pharmacy.

## 2020-02-04 ENCOUNTER — ANCILLARY ORDERS (OUTPATIENT)
Dept: CARDIOLOGY | Age: 85
End: 2020-02-04

## 2020-02-04 ENCOUNTER — ANCILLARY PROCEDURE (OUTPATIENT)
Dept: CARDIOLOGY | Age: 85
End: 2020-02-04
Attending: INTERNAL MEDICINE

## 2020-02-04 DIAGNOSIS — Z95.0 CARDIAC PACEMAKER: ICD-10-CM

## 2020-02-04 PROCEDURE — X1114 CARDIAC DEVICE HOME CHECK - REMOTE UNSCHEDULED: HCPCS | Performed by: INTERNAL MEDICINE

## 2020-02-12 ENCOUNTER — NURSE ONLY (OUTPATIENT)
Dept: ENDOCRINOLOGY CLINIC | Facility: CLINIC | Age: 85
End: 2020-02-12
Payer: MEDICARE

## 2020-02-12 DIAGNOSIS — E11.65 UNCONTROLLED TYPE 2 DIABETES MELLITUS WITH HYPERGLYCEMIA (HCC): Primary | ICD-10-CM

## 2020-02-12 LAB
CARTRIDGE LOT#: ABNORMAL NUMERIC
HEMOGLOBIN A1C: 9.1 % (ref 4.3–5.6)
HGB BLD-MCNC: 9.1 G/DL

## 2020-02-12 PROCEDURE — 36416 COLLJ CAPILLARY BLOOD SPEC: CPT | Performed by: INTERNAL MEDICINE

## 2020-02-12 PROCEDURE — 83036 HEMOGLOBIN GLYCOSYLATED A1C: CPT | Performed by: INTERNAL MEDICINE

## 2020-02-12 RX ORDER — GLIMEPIRIDE 4 MG/1
TABLET ORAL
Qty: 90 TABLET | Refills: 0 | Status: SHIPPED | OUTPATIENT
Start: 2020-02-12 | End: 2020-05-01

## 2020-02-12 NOTE — PROGRESS NOTES
Pt here for [facility] HAMLET check today. Discussed results with [facility] and continue present management.

## 2020-02-24 RX ORDER — BLOOD SUGAR DIAGNOSTIC
STRIP MISCELLANEOUS
Refills: 1 | COMMUNITY
Start: 2019-12-22

## 2020-02-25 ENCOUNTER — OFFICE VISIT (OUTPATIENT)
Dept: CARDIOLOGY | Age: 85
End: 2020-02-25

## 2020-02-25 VITALS
DIASTOLIC BLOOD PRESSURE: 70 MMHG | BODY MASS INDEX: 35.7 KG/M2 | HEART RATE: 92 BPM | SYSTOLIC BLOOD PRESSURE: 118 MMHG | HEIGHT: 62 IN | WEIGHT: 194 LBS | OXYGEN SATURATION: 90 %

## 2020-02-25 DIAGNOSIS — K92.2 GASTROINTESTINAL HEMORRHAGE, UNSPECIFIED GASTROINTESTINAL HEMORRHAGE TYPE: ICD-10-CM

## 2020-02-25 DIAGNOSIS — D64.9 ANEMIA, UNSPECIFIED TYPE: ICD-10-CM

## 2020-02-25 DIAGNOSIS — I48.0 PAROXYSMAL ATRIAL FIBRILLATION (CMD): Primary | ICD-10-CM

## 2020-02-25 PROCEDURE — 99215 OFFICE O/P EST HI 40 MIN: CPT | Performed by: INTERNAL MEDICINE

## 2020-02-25 SDOH — HEALTH STABILITY: MENTAL HEALTH: HOW OFTEN DO YOU HAVE A DRINK CONTAINING ALCOHOL?: NEVER

## 2020-02-25 ASSESSMENT — PATIENT HEALTH QUESTIONNAIRE - PHQ9
2. FEELING DOWN, DEPRESSED OR HOPELESS: NOT AT ALL
SUM OF ALL RESPONSES TO PHQ9 QUESTIONS 1 AND 2: 0
1. LITTLE INTEREST OR PLEASURE IN DOING THINGS: NOT AT ALL
SUM OF ALL RESPONSES TO PHQ9 QUESTIONS 1 AND 2: 0

## 2020-03-04 ENCOUNTER — TELEPHONE (OUTPATIENT)
Dept: ENDOCRINOLOGY CLINIC | Facility: CLINIC | Age: 85
End: 2020-03-04

## 2020-03-04 NOTE — TELEPHONE ENCOUNTER
Patient's daughter was contacted and states patient's blood sugar the past 3 days have been on the 180's before breakfast. She states this might be diet related because before these past 3 days, levels have been good.   Today at lunch time patient's sugar w

## 2020-03-04 NOTE — TELEPHONE ENCOUNTER
Tried to dial patient's daughter's number on file [place] - on MICHELL) twice and both attempts went straight to voicemail. Left message to call back.     Called patient's home phone number on file, spoke to patient who stated she prefers for RN to talk to her d

## 2020-03-11 ENCOUNTER — OFFICE VISIT (OUTPATIENT)
Dept: ENDOCRINOLOGY CLINIC | Facility: CLINIC | Age: 85
End: 2020-03-11
Payer: MEDICARE

## 2020-03-11 VITALS
SYSTOLIC BLOOD PRESSURE: 132 MMHG | BODY MASS INDEX: 37 KG/M2 | DIASTOLIC BLOOD PRESSURE: 77 MMHG | HEART RATE: 68 BPM | WEIGHT: 201 LBS

## 2020-03-11 DIAGNOSIS — E11.65 UNCONTROLLED TYPE 2 DIABETES MELLITUS WITH HYPERGLYCEMIA (HCC): Primary | ICD-10-CM

## 2020-03-11 LAB
CARTRIDGE EXPIRATION DATE: ABNORMAL DATE
CARTRIDGE LOT#: ABNORMAL NUMERIC
GLUCOSE BLOOD: 254
HEMOGLOBIN A1C: 9.5 % (ref 4.3–5.6)
TEST STRIP LOT #: NORMAL NUMERIC

## 2020-03-11 PROCEDURE — 99214 OFFICE O/P EST MOD 30 MIN: CPT | Performed by: INTERNAL MEDICINE

## 2020-03-11 PROCEDURE — 36416 COLLJ CAPILLARY BLOOD SPEC: CPT | Performed by: INTERNAL MEDICINE

## 2020-03-11 PROCEDURE — 82962 GLUCOSE BLOOD TEST: CPT | Performed by: INTERNAL MEDICINE

## 2020-03-11 PROCEDURE — 83036 HEMOGLOBIN GLYCOSYLATED A1C: CPT | Performed by: INTERNAL MEDICINE

## 2020-03-11 NOTE — PROGRESS NOTES
Name: Agustin Zepeda  Date: 3/11/2020    Referring Physician: No ref. provider found    HISTORY OF PRESENT ILLNESS   Agustin Zepeda is a 80year old female who presents for diabetes mellitus.      She was hospitalized in 11/2017 with severe RSV PNA requiring BREAKFAST, Disp: 90 tablet, Rfl: 0  •  Insulin Pen Needle (BD PEN NEEDLE ANTOINE U/F) 32G X 4 MM Does not apply Misc, Use to inject Tresiba insulin once daily.  DX: E11.65 with insulin use, Disp: 90 each, Rfl: 2  •  OZEMPIC, 0.25 OR 0.5 MG/DOSE, 2 MG/1.5ML Sub 5000 units Oral Cap, Take 1 capsule (5,000 Units total) by mouth daily. (Patient taking differently: Take 1,000 Units by mouth daily.  ), Disp: 30 capsule, Rfl: 1  •  furosemide 40 MG Oral Tab, Take 1 tablet (40 mg total) by mouth daily.  (Patient taking di blood pressure    • Neuropathy    • PONV (postoperative nausea and vomiting)    • TIA (transient ischemic attack)    • Visual impairment        Surgical history:   Past Surgical History:   Procedure Laterality Date   • CARDIAC PACEMAKER PLACEMENT     • COL importance of glycemic control to prevent complications of diabetes  -Discussed complications of diabetes include retinopathy, neuropathy, nephropathy and cardiovascular disease  -Discussed risks of insulin and hypoglycemia in elderly female particularly g

## 2020-03-18 RX ORDER — BLOOD SUGAR DIAGNOSTIC
STRIP MISCELLANEOUS
Qty: 200 STRIP | Refills: 2 | Status: SHIPPED | OUTPATIENT
Start: 2020-03-18 | End: 2020-12-07

## 2020-03-23 ENCOUNTER — TELEPHONE (OUTPATIENT)
Dept: CARDIOLOGY | Age: 85
End: 2020-03-23

## 2020-03-24 ENCOUNTER — TELEPHONE (OUTPATIENT)
Dept: CARDIOLOGY | Age: 85
End: 2020-03-24

## 2020-04-06 ENCOUNTER — TELEPHONE (OUTPATIENT)
Dept: CARDIOLOGY | Age: 85
End: 2020-04-06

## 2020-04-06 DIAGNOSIS — E78.00 PURE HYPERCHOLESTEROLEMIA: Primary | ICD-10-CM

## 2020-04-06 RX ORDER — ATORVASTATIN CALCIUM 20 MG/1
20 TABLET, FILM COATED ORAL DAILY
Qty: 90 TABLET | Refills: 0 | Status: SHIPPED | OUTPATIENT
Start: 2020-04-06 | End: 2020-09-29

## 2020-04-13 RX ORDER — INSULIN DEGLUDEC 200 U/ML
INJECTION, SOLUTION SUBCUTANEOUS
Qty: 18 ML | Refills: 0 | Status: SHIPPED | OUTPATIENT
Start: 2020-04-13 | End: 2020-07-13

## 2020-04-27 ENCOUNTER — ANCILLARY PROCEDURE (OUTPATIENT)
Dept: CARDIOLOGY | Age: 85
End: 2020-04-27
Attending: INTERNAL MEDICINE

## 2020-04-27 ENCOUNTER — ANCILLARY ORDERS (OUTPATIENT)
Dept: CARDIOLOGY | Age: 85
End: 2020-04-27

## 2020-04-27 DIAGNOSIS — Z95.0 CARDIAC PACEMAKER: ICD-10-CM

## 2020-04-27 PROCEDURE — 93296 REM INTERROG EVL PM/IDS: CPT | Performed by: INTERNAL MEDICINE

## 2020-04-27 PROCEDURE — 93294 REM INTERROG EVL PM/LDLS PM: CPT | Performed by: INTERNAL MEDICINE

## 2020-04-27 PROCEDURE — X1114 CARDIAC DEVICE HOME CHECK - REMOTE UNSCHEDULED: HCPCS | Performed by: INTERNAL MEDICINE

## 2020-05-01 RX ORDER — GLIMEPIRIDE 4 MG/1
TABLET ORAL
Qty: 90 TABLET | Refills: 1 | Status: SHIPPED | OUTPATIENT
Start: 2020-05-01 | End: 2020-11-06

## 2020-05-05 ENCOUNTER — TELEPHONE (OUTPATIENT)
Dept: ENDOCRINOLOGY CLINIC | Facility: CLINIC | Age: 85
End: 2020-05-05

## 2020-05-05 NOTE — TELEPHONE ENCOUNTER
Called and spoke with the patient's daughter Rosalio Lux  Informed her of Dr. Vincent Alcantara instructions below. Pre lunch sugar is currently 387. She will give tresiba now and will continue to monitor sugars.    Will give next dose tomorrow morning and will giv

## 2020-05-05 NOTE — TELEPHONE ENCOUNTER
Sent call to LUZ MARINA Simmons states patient forgot to inject insulin last night and has an Elevated Fasting Blood Sugar at 265 mg/dL. Please call. Thank you.

## 2020-05-05 NOTE — TELEPHONE ENCOUNTER
Noted. To call if Bg bryan snot come down by the evening  Also, she did take amaryl today am right?    Thanks

## 2020-05-05 NOTE — TELEPHONE ENCOUNTER
Daughter calling patient forgot to take her blood sugar last evening and also her insulin (tresiba) and this am her blood sugar is 265 . She takes glimepiride 4mg in am with breakfast.  Patient denies any other symptoms,   Will have breakfast now .  Advise

## 2020-05-07 NOTE — TELEPHONE ENCOUNTER
Called patient to check on blood sugars  5/6   Am 277   Pm 205  5/7 am 173  Saira thinks patient blood sugars starting to come down. Advised to call if blood sugar dont continue to trend down.    cheikh

## 2020-05-12 ENCOUNTER — PATIENT MESSAGE (OUTPATIENT)
Dept: ENDOCRINOLOGY CLINIC | Facility: CLINIC | Age: 85
End: 2020-05-12

## 2020-05-12 NOTE — TELEPHONE ENCOUNTER
From: Elvia Gerardo Loss  To: Michelle Alves MD  Sent: 5/12/2020 11:06 AM CDT  Subject: Visit Follow-up Mary Vargas, Dr. Deacon Gregorio requested I let her know if my glucose levels have been over 200 in the mornings.  Since my insulin dosage was changed from dinner

## 2020-05-14 NOTE — TELEPHONE ENCOUNTER
Kaci Guajardo, pt daughter was asking if its okay that pt does not have the Sophronia [place] for over 24 hours, since last dose was yesterday morning. Regimen was changed to take Sophronia Armando at dinner time.

## 2020-05-14 NOTE — TELEPHONE ENCOUNTER
Spoke with daughter, Scooby Hernandez. Pt blood sugar was 145 this morning (had toast and an apple). She just checked pt blood sugar prior to eating lunch and it is 287.  Pt daughter wanted me to double check with you that it is okay to still hold off until this ev

## 2020-05-14 NOTE — TELEPHONE ENCOUNTER
Yes, that is fine. Franco Schilling has flexible day dosing to she can wait and take medication this evening. Thanks.

## 2020-05-21 RX ORDER — GABAPENTIN 600 MG/1
TABLET ORAL
Qty: 270 TABLET | Refills: 1 | Status: SHIPPED | OUTPATIENT
Start: 2020-05-21 | End: 2021-01-21

## 2020-06-15 RX ORDER — AMIODARONE HYDROCHLORIDE 200 MG/1
TABLET ORAL
Qty: 30 TABLET | Refills: 5 | Status: SHIPPED | OUTPATIENT
Start: 2020-06-15 | End: 2020-08-10 | Stop reason: DRUGHIGH

## 2020-06-26 ENCOUNTER — TELEPHONE (OUTPATIENT)
Dept: ENDOCRINOLOGY CLINIC | Facility: CLINIC | Age: 85
End: 2020-06-26

## 2020-06-26 ENCOUNTER — TELEMEDICINE (OUTPATIENT)
Dept: ENDOCRINOLOGY CLINIC | Facility: CLINIC | Age: 85
End: 2020-06-26
Payer: MEDICARE

## 2020-06-26 DIAGNOSIS — E11.8 UNCONTROLLED TYPE 2 DIABETES MELLITUS WITH COMPLICATION, WITH LONG-TERM CURRENT USE OF INSULIN (HCC): Primary | ICD-10-CM

## 2020-06-26 DIAGNOSIS — Z79.4 UNCONTROLLED TYPE 2 DIABETES MELLITUS WITH COMPLICATION, WITH LONG-TERM CURRENT USE OF INSULIN (HCC): Primary | ICD-10-CM

## 2020-06-26 DIAGNOSIS — E11.65 UNCONTROLLED TYPE 2 DIABETES MELLITUS WITH COMPLICATION, WITH LONG-TERM CURRENT USE OF INSULIN (HCC): Primary | ICD-10-CM

## 2020-06-26 PROCEDURE — 99214 OFFICE O/P EST MOD 30 MIN: CPT | Performed by: INTERNAL MEDICINE

## 2020-06-26 NOTE — TELEPHONE ENCOUNTER
Spoke with daughter, David. She was requesting to change visit to video visit. Approved by MD. Ira Priest message with link for instructions.

## 2020-06-26 NOTE — PROGRESS NOTES
Telehealth outside of [address] Verbal Consent   I conducted a telehealth visit with Agustin Oliva Loss today, 06/26/20, which was completed using two-way, real-time interactive audio and video communication.  This has been done in good hi to provid 11.6% 12/2019; 9.1% 2/2020; 9.5% 3/2020; 9.8% 5/2020  Dietary compliance: Good -->she notes some meals are more fast food or going out to eat   Exercise: No  Polyuria/polydipsia: No  Blurred vision: No    Episodes of hypoglycemia: No  Blood Glucose:  Check VERIO) In Vitro Strip, Check sugars twice daily as directed, Disp: 100 strip, Rfl: 0  •  Glucose Blood (ONETOUCH VERIO) In Vitro Strip, Use to check sugars 2 times daily, Disp: 150 strip, Rfl: 3  •  Insulin Degludec (TRESIBA FLEXTOUCH) 100 UNIT/ML Subcutan       Spouse name: Not on file      Number of children: Not on file      Years of education: Not on file      Highest education level: Not on file    Tobacco Use      Smoking status: Former Smoker      Smokeless tobacco: Never Used    Substance and increased work of breathing. Lymph Nodes:  No abnormal nodes noted  Skin:  normal moisture and skin texture  Hematologic:  no excessive bruising  Psychiatric:  oriented to time, self, and place      ASSESSMENT/PLAN:      1.  Diabetes Mellitus Type 2, UnC

## 2020-07-13 RX ORDER — INSULIN DEGLUDEC 200 U/ML
INJECTION, SOLUTION SUBCUTANEOUS
Qty: 18 ML | Refills: 0 | Status: SHIPPED | OUTPATIENT
Start: 2020-07-13 | End: 2020-10-07

## 2020-08-03 ENCOUNTER — ANCILLARY PROCEDURE (OUTPATIENT)
Dept: CARDIOLOGY | Age: 85
End: 2020-08-03
Attending: INTERNAL MEDICINE

## 2020-08-03 DIAGNOSIS — Z95.0 CARDIAC PACEMAKER: ICD-10-CM

## 2020-08-03 PROCEDURE — 93296 REM INTERROG EVL PM/IDS: CPT | Performed by: INTERNAL MEDICINE

## 2020-08-03 PROCEDURE — 93294 REM INTERROG EVL PM/LDLS PM: CPT | Performed by: INTERNAL MEDICINE

## 2020-08-10 ENCOUNTER — V-VISIT (OUTPATIENT)
Dept: CARDIOLOGY | Age: 85
End: 2020-08-10

## 2020-08-10 DIAGNOSIS — Z95.0 PRESENCE OF CARDIAC PACEMAKER: ICD-10-CM

## 2020-08-10 DIAGNOSIS — I25.10 ATHEROSCLEROSIS OF NATIVE CORONARY ARTERY OF NATIVE HEART WITHOUT ANGINA PECTORIS: Primary | ICD-10-CM

## 2020-08-10 DIAGNOSIS — E78.00 PURE HYPERCHOLESTEROLEMIA: ICD-10-CM

## 2020-08-10 DIAGNOSIS — I48.0 PAROXYSMAL ATRIAL FIBRILLATION (CMD): ICD-10-CM

## 2020-08-10 DIAGNOSIS — R53.83 FATIGUE, UNSPECIFIED TYPE: ICD-10-CM

## 2020-08-10 DIAGNOSIS — K92.2 GASTROINTESTINAL HEMORRHAGE, UNSPECIFIED GASTROINTESTINAL HEMORRHAGE TYPE: ICD-10-CM

## 2020-08-10 PROCEDURE — 99213 OFFICE O/P EST LOW 20 MIN: CPT | Performed by: INTERNAL MEDICINE

## 2020-08-10 RX ORDER — FUROSEMIDE 40 MG/1
40 TABLET ORAL DAILY
Qty: 90 TABLET | Refills: 3 | Status: SHIPPED | OUTPATIENT
Start: 2020-08-10

## 2020-08-10 RX ORDER — AMIODARONE HYDROCHLORIDE 200 MG/1
200 TABLET ORAL EVERY OTHER DAY
Qty: 45 TABLET | Refills: 5 | Status: SHIPPED | OUTPATIENT
Start: 2020-08-10

## 2020-08-10 ASSESSMENT — ENCOUNTER SYMPTOMS
ALLERGIC/IMMUNOLOGIC COMMENTS: NO NEW FOOD ALLERGIES
BRUISES/BLEEDS EASILY: 0
HEMATOCHEZIA: 0
WEIGHT LOSS: 0
CHILLS: 0
SUSPICIOUS LESIONS: 0
FEVER: 0
WEIGHT GAIN: 0
COUGH: 0
HEMOPTYSIS: 0

## 2020-09-03 ENCOUNTER — HOSPITAL ENCOUNTER (OUTPATIENT)
Age: 85
Discharge: HOME | End: 2020-09-03
Attending: EMERGENCY MEDICINE
Payer: MEDICARE

## 2020-09-03 VITALS
DIASTOLIC BLOOD PRESSURE: 60 MMHG | SYSTOLIC BLOOD PRESSURE: 152 MMHG | OXYGEN SATURATION: 97 % | RESPIRATION RATE: 18 BRPM | TEMPERATURE: 98 F | HEART RATE: 67 BPM

## 2020-09-03 DIAGNOSIS — B02.9 HERPES ZOSTER WITHOUT COMPLICATION: Primary | ICD-10-CM

## 2020-09-03 DIAGNOSIS — N18.9 CHRONIC RENAL IMPAIRMENT, UNSPECIFIED CKD STAGE: ICD-10-CM

## 2020-09-03 LAB
CREAT BLD-MCNC: 1.4 MG/DL (ref 0.55–1.02)
GLUCOSE BLD-MCNC: 168 MG/DL (ref 70–99)
ISTAT BUN: 48 MG/DL (ref 7–18)
ISTAT CHLORIDE: 103 MMOL/L (ref 98–112)
ISTAT HEMATOCRIT: 49 %
ISTAT IONIZED CALCIUM FOR CHEM 8: 0.95 MMOL/L (ref 1.12–1.32)
ISTAT POTASSIUM: 4.4 MMOL/L (ref 3.6–5.1)
ISTAT SODIUM: 136 MMOL/L (ref 136–145)
ISTAT TCO2: 27 MMOL/L (ref 21–32)

## 2020-09-03 PROCEDURE — 99213 OFFICE O/P EST LOW 20 MIN: CPT

## 2020-09-03 PROCEDURE — 99214 OFFICE O/P EST MOD 30 MIN: CPT

## 2020-09-03 PROCEDURE — 80047 BASIC METABLC PNL IONIZED CA: CPT

## 2020-09-03 PROCEDURE — 36415 COLL VENOUS BLD VENIPUNCTURE: CPT

## 2020-09-03 RX ORDER — VALACYCLOVIR HYDROCHLORIDE 1 G/1
1 TABLET, FILM COATED ORAL 2 TIMES DAILY
Qty: 21 TABLET | Refills: 0 | Status: SHIPPED | OUTPATIENT
Start: 2020-09-03 | End: 2020-09-10

## 2020-09-03 NOTE — ED INITIAL ASSESSMENT (HPI)
C/o pain to left shoulder and scapular area for 5 days with small rash to left upper back. Recent hx of surgical repair to shoulder. Saw ortho yesterday and told shoulder healing well.  Today, rash much worse to back and present on left side of chest. No fe

## 2020-09-04 NOTE — ED PROVIDER NOTES
Patient Seen in: [facility]      History   Patient presents with:  Rash Skin Problem    Stated Complaint: body rash    HPI  The patient is a 44-year-old female with a past history of atrial fibrillation, coronary artery d ENDOSCOPY   • HYSTERECTOMY     • OTHER      2 cardiac stents   • OTHER      left shoulder sx approx 6/27/19                Family history reviewed with patient/caregiver and is not pertinent to presenting problem.     Social History    Tobacco Use      Smok (*)     All other components within normal limits          Pulse ox is 97% on room air, vitals are normal.    Patient has a history of renal insufficiency. The patient has not had labs done since 8/2019.   Will perform labs here to rule out any worsening o

## 2020-09-08 ENCOUNTER — TELEPHONE (OUTPATIENT)
Dept: CARDIOLOGY | Age: 85
End: 2020-09-08

## 2020-09-29 RX ORDER — ATORVASTATIN CALCIUM 20 MG/1
TABLET, FILM COATED ORAL
Qty: 90 TABLET | Refills: 3 | Status: SHIPPED | OUTPATIENT
Start: 2020-09-29 | End: 2021-09-09

## 2020-10-08 RX ORDER — INSULIN DEGLUDEC 200 U/ML
36 INJECTION, SOLUTION SUBCUTANEOUS DAILY
Qty: 33 ML | Refills: 0 | Status: SHIPPED | OUTPATIENT
Start: 2020-10-08 | End: 2021-07-30

## 2020-10-08 NOTE — TELEPHONE ENCOUNTER
Pt is due for a follow-up. RN/MA's - please call pt to book apt.     LOV 6/26/20 RTC 3 mos  No F/U     Pended insulin for provider

## 2020-11-06 RX ORDER — GLIMEPIRIDE 4 MG/1
TABLET ORAL
Qty: 90 TABLET | Refills: 1 | Status: SHIPPED | OUTPATIENT
Start: 2020-11-06 | End: 2021-05-05

## 2020-11-16 ENCOUNTER — ANCILLARY PROCEDURE (OUTPATIENT)
Dept: CARDIOLOGY | Age: 85
End: 2020-11-16
Attending: INTERNAL MEDICINE

## 2020-11-16 DIAGNOSIS — Z95.0 CARDIAC PACEMAKER: ICD-10-CM

## 2020-11-16 PROCEDURE — 93294 REM INTERROG EVL PM/LDLS PM: CPT | Performed by: INTERNAL MEDICINE

## 2020-11-16 PROCEDURE — 93296 REM INTERROG EVL PM/IDS: CPT | Performed by: INTERNAL MEDICINE

## 2020-12-07 RX ORDER — BLOOD SUGAR DIAGNOSTIC
STRIP MISCELLANEOUS
Qty: 200 STRIP | Refills: 2 | Status: SHIPPED | OUTPATIENT
Start: 2020-12-07 | End: 2021-09-03

## 2020-12-15 ENCOUNTER — PATIENT MESSAGE (OUTPATIENT)
Dept: ENDOCRINOLOGY CLINIC | Facility: CLINIC | Age: 85
End: 2020-12-15

## 2020-12-16 NOTE — TELEPHONE ENCOUNTER
From: Elvia Gerardo Loss  To: Michelle Alves MD  Sent: 12/15/2020 12:51 PM CST  Subject: Other    I'm scheduled for an appt on Dec 17 at 3:00. I need this to be an e-visit. Is this possible?

## 2020-12-16 NOTE — TELEPHONE ENCOUNTER
Dr. Amador Maldonado -- patient is requesting for video visit for tomorrow's visit. Would you like an A1C done at the lab prior to tomorrow or will you just order it tomorrow to have it done whenever she can. Last A1C 03/11/20 9.5%. Thank you.

## 2020-12-16 NOTE — TELEPHONE ENCOUNTER
Left message to call back and sent mychart message asking which video visit method she would prefer.

## 2020-12-17 ENCOUNTER — TELEMEDICINE (OUTPATIENT)
Dept: ENDOCRINOLOGY CLINIC | Facility: CLINIC | Age: 85
End: 2020-12-17
Payer: MEDICARE

## 2020-12-17 DIAGNOSIS — E11.65 UNCONTROLLED TYPE 2 DIABETES MELLITUS WITH HYPERGLYCEMIA (HCC): Primary | ICD-10-CM

## 2020-12-17 PROCEDURE — 99213 OFFICE O/P EST LOW 20 MIN: CPT | Performed by: INTERNAL MEDICINE

## 2020-12-17 NOTE — TELEPHONE ENCOUNTER
Per patient's request, apt converted into a mychart video visit. Instructions on how to access video visit have already been sent to her via [facility] by Yakov Waters. Thank you!

## 2020-12-17 NOTE — PROGRESS NOTES
Name: Svitlana Zepeda  Date: 12/17/2020    Referring Physician: No ref. provider found    HISTORY OF PRESENT ILLNESS   Svitlana Zepeda is a 80year old female who presents for diabetes mellitus.      Dr. Antolin Long, [facility]    Prior HbA, C or glycohemo sodium 100 MG Oral Cap, Take 1 capsule (100 mg total) by mouth 2 (two) times daily. , Disp: 60 capsule, Rfl: 1  •  Acetaminophen (TYLENOL EXTRA STRENGTH OR), Take 1 tablet by mouth nightly as needed. , Disp: , Rfl:   •  Glucose Blood (Any Rios) In Vitr Opioid Analgesics       NAUSEA AND VOMITING  Codeine                     Comment:Hallucinating, vomiting  Diazepam                    Comment:vomiting    Social History:   Social History    Socioeconomic History      Marital status:        Spouse PHYSICAL EXAM  General Appearance:  alert, well developed, in no acute distress  Eyes:  normal conjunctivae, sclera. , normal sclera and normal pupils  Throat/Neck: normal sound to voice. Back: no kyphosis  Respiratory:  non-labored.  no increased wo time.  This billing was spent on reviewing labs, medications, radiology tests and decision making. Appropriate medical decision-making and tests are ordered as detailed in the plan of care above.

## 2021-01-05 RX ORDER — PEN NEEDLE, DIABETIC 32GX 5/32"
NEEDLE, DISPOSABLE MISCELLANEOUS
Qty: 100 EACH | Refills: 2 | Status: SHIPPED | OUTPATIENT
Start: 2021-01-05

## 2021-01-21 RX ORDER — GABAPENTIN 600 MG/1
600 TABLET ORAL 3 TIMES DAILY
Qty: 270 TABLET | Refills: 0 | Status: SHIPPED | OUTPATIENT
Start: 2021-01-21

## 2021-01-21 NOTE — TELEPHONE ENCOUNTER
LVV 12/17/20. RTC 3 months. Called daughter Libertad Stiles on file) and LMTCB to schedule appointment. Pended 3 month supply for review.

## 2021-01-21 NOTE — TELEPHONE ENCOUNTER
•  GABAPENTIN 600 MG Oral Tab, TAKE 1 TABLET(600 MG) BY MOUTH THREE TIMES DAILY, Disp: 270 tablet, Rfl: 1

## 2021-01-28 ENCOUNTER — TELEPHONE (OUTPATIENT)
Dept: CARDIOLOGY | Age: 86
End: 2021-01-28

## 2021-02-01 DIAGNOSIS — Z23 NEED FOR VACCINATION: ICD-10-CM

## 2021-02-26 ENCOUNTER — APPOINTMENT (OUTPATIENT)
Dept: CARDIOLOGY | Age: 86
End: 2021-02-26

## 2021-03-01 PROCEDURE — 93294 REM INTERROG EVL PM/LDLS PM: CPT | Performed by: INTERNAL MEDICINE

## 2021-03-01 PROCEDURE — 93296 REM INTERROG EVL PM/IDS: CPT | Performed by: INTERNAL MEDICINE

## 2021-03-04 ENCOUNTER — ANCILLARY ORDERS (OUTPATIENT)
Dept: CARDIOLOGY | Age: 86
End: 2021-03-04

## 2021-03-04 ENCOUNTER — ANCILLARY PROCEDURE (OUTPATIENT)
Dept: CARDIOLOGY | Age: 86
End: 2021-03-04
Attending: INTERNAL MEDICINE

## 2021-03-04 ENCOUNTER — TELEPHONE (OUTPATIENT)
Dept: CARDIOLOGY | Age: 86
End: 2021-03-04

## 2021-03-04 DIAGNOSIS — Z45.018 PACEMAKER REPROGRAMMING/CHECK: ICD-10-CM

## 2021-03-04 DIAGNOSIS — Z45.018 ENCOUNTER FOR CARE OF PACEMAKER: ICD-10-CM

## 2021-03-04 PROCEDURE — X1114 CARDIAC DEVICE HOME CHECK - REMOTE UNSCHEDULED: HCPCS | Performed by: INTERNAL MEDICINE

## 2021-04-30 ENCOUNTER — HOSPITAL ENCOUNTER (OUTPATIENT)
Dept: ULTRASOUND IMAGING | Facility: HOSPITAL | Age: 86
Discharge: HOME | End: 2021-04-30
Attending: FAMILY MEDICINE
Payer: MEDICARE

## 2021-04-30 DIAGNOSIS — I73.9 PAD (PERIPHERAL ARTERY DISEASE) (HCC): ICD-10-CM

## 2021-04-30 DIAGNOSIS — I73.9 CLAUDICATION (HCC): ICD-10-CM

## 2021-04-30 PROCEDURE — 93923 UPR/LXTR ART STDY 3+ LVLS: CPT | Performed by: PODIATRIST

## 2021-05-05 RX ORDER — GLIMEPIRIDE 4 MG/1
TABLET ORAL
Qty: 90 TABLET | Refills: 1 | Status: SHIPPED | OUTPATIENT
Start: 2021-05-05

## 2021-05-12 ENCOUNTER — OFFICE VISIT (OUTPATIENT)
Dept: ENDOCRINOLOGY CLINIC | Facility: CLINIC | Age: 86
End: 2021-05-12
Payer: MEDICARE

## 2021-05-12 VITALS
WEIGHT: 207 LBS | HEART RATE: 61 BPM | BODY MASS INDEX: 38 KG/M2 | DIASTOLIC BLOOD PRESSURE: 77 MMHG | SYSTOLIC BLOOD PRESSURE: 136 MMHG

## 2021-05-12 DIAGNOSIS — E11.65 UNCONTROLLED TYPE 2 DIABETES MELLITUS WITH HYPERGLYCEMIA (HCC): Primary | ICD-10-CM

## 2021-05-12 PROCEDURE — 3075F SYST BP GE 130 - 139MM HG: CPT | Performed by: INTERNAL MEDICINE

## 2021-05-12 PROCEDURE — 36416 COLLJ CAPILLARY BLOOD SPEC: CPT | Performed by: INTERNAL MEDICINE

## 2021-05-12 PROCEDURE — 82947 ASSAY GLUCOSE BLOOD QUANT: CPT | Performed by: INTERNAL MEDICINE

## 2021-05-12 PROCEDURE — 99213 OFFICE O/P EST LOW 20 MIN: CPT | Performed by: INTERNAL MEDICINE

## 2021-05-12 PROCEDURE — 3078F DIAST BP <80 MM HG: CPT | Performed by: INTERNAL MEDICINE

## 2021-05-12 PROCEDURE — 83036 HEMOGLOBIN GLYCOSYLATED A1C: CPT | Performed by: INTERNAL MEDICINE

## 2021-05-12 NOTE — PROGRESS NOTES
Name: Becky Zepeda  Date: 5/12/2021    Referring Physician: No ref. provider found    HISTORY OF PRESENT ILLNESS   Becky Zepeda is a 80year old female who presents for diabetes mellitus.      Dr. Justin Hernandes, [facility]    Prior HbA, C or glycohemog MG Oral Cap, Take 1 capsule (100 mg total) by mouth 2 (two) times daily. , Disp: 60 capsule, Rfl: 1  •  Acetaminophen (TYLENOL EXTRA STRENGTH OR), Take 1 tablet by mouth nightly as needed. , Disp: , Rfl:   •  Glucose Blood (ONETOUCH VERIO) In Vitro Strip, Ch Opioid Analgesics       NAUSEA AND VOMITING  Codeine                     Comment:Hallucinating, vomiting  Diazepam                    Comment:vomiting    Social History:   Social History    Socioeconomic History      Marital status:        Spouse excessive bruising  Psychiatric:  oriented to time, self, and place    ASSESSMENT/PLAN:      1.  Diabetes Mellitus Type 2, UnControlled  -uncontrolled; HgA1c 8.3% -->improved   -Her fasting BG levels are at goal although she does have some mild post prandia

## 2021-05-18 ENCOUNTER — TELEPHONE (OUTPATIENT)
Dept: CARDIOLOGY | Age: 86
End: 2021-05-18

## 2021-06-04 PROCEDURE — 93296 REM INTERROG EVL PM/IDS: CPT | Performed by: INTERNAL MEDICINE

## 2021-06-21 ENCOUNTER — ANCILLARY PROCEDURE (OUTPATIENT)
Dept: CARDIOLOGY | Age: 86
End: 2021-06-21
Attending: INTERNAL MEDICINE

## 2021-06-21 VITALS
RESPIRATION RATE: 16 BRPM | WEIGHT: 206 LBS | HEART RATE: 64 BPM | DIASTOLIC BLOOD PRESSURE: 58 MMHG | SYSTOLIC BLOOD PRESSURE: 130 MMHG | BODY MASS INDEX: 37.68 KG/M2

## 2021-06-21 DIAGNOSIS — Z45.018 PACEMAKER REPROGRAMMING/CHECK: Primary | ICD-10-CM

## 2021-06-21 PROCEDURE — 93288 INTERROG EVL PM/LDLS PM IP: CPT | Performed by: INTERNAL MEDICINE

## 2021-07-30 RX ORDER — INSULIN DEGLUDEC 200 U/ML
INJECTION, SOLUTION SUBCUTANEOUS
Qty: 18 ML | Refills: 1 | Status: SHIPPED | OUTPATIENT
Start: 2021-07-30

## 2021-08-13 ENCOUNTER — TELEPHONE (OUTPATIENT)
Dept: ENDOCRINOLOGY CLINIC | Facility: CLINIC | Age: 86
End: 2021-08-13

## 2021-08-25 ENCOUNTER — TELEPHONE (OUTPATIENT)
Dept: CARDIOLOGY | Age: 86
End: 2021-08-25

## 2021-08-26 NOTE — PROGRESS NOTES
[facility]    Progress Note    Daysi Zepeda Patient Status:  Inpatient    1930 MRN [id number]   Location [facility] 4W/SW/SE Attending Anais Mena, [address] [facility]  Day # 3 PCP Jerica Tee       Subjective:   Daysi Diggs Loss is a High Risk (score 12 or above)

## 2021-09-01 ENCOUNTER — OFFICE VISIT (OUTPATIENT)
Dept: ENDOCRINOLOGY CLINIC | Facility: CLINIC | Age: 86
End: 2021-09-01
Payer: MEDICARE

## 2021-09-01 VITALS
DIASTOLIC BLOOD PRESSURE: 82 MMHG | WEIGHT: 201 LBS | HEART RATE: 73 BPM | SYSTOLIC BLOOD PRESSURE: 140 MMHG | BODY MASS INDEX: 37 KG/M2

## 2021-09-01 DIAGNOSIS — E11.65 UNCONTROLLED TYPE 2 DIABETES MELLITUS WITH HYPERGLYCEMIA (HCC): Primary | ICD-10-CM

## 2021-09-01 LAB
CARTRIDGE LOT#: ABNORMAL NUMERIC
GLUCOSE BLOOD: 203
HEMOGLOBIN A1C: 8.3 % (ref 4.3–5.6)
TEST STRIP LOT #: NORMAL NUMERIC

## 2021-09-01 PROCEDURE — 82947 ASSAY GLUCOSE BLOOD QUANT: CPT | Performed by: INTERNAL MEDICINE

## 2021-09-01 PROCEDURE — 3077F SYST BP >= 140 MM HG: CPT | Performed by: INTERNAL MEDICINE

## 2021-09-01 PROCEDURE — 99213 OFFICE O/P EST LOW 20 MIN: CPT | Performed by: INTERNAL MEDICINE

## 2021-09-01 PROCEDURE — 3079F DIAST BP 80-89 MM HG: CPT | Performed by: INTERNAL MEDICINE

## 2021-09-01 PROCEDURE — 83036 HEMOGLOBIN GLYCOSYLATED A1C: CPT | Performed by: INTERNAL MEDICINE

## 2021-09-01 NOTE — PROGRESS NOTES
Name: Elvia Zepeda  Date: 9/1/2021    Referring Physician: No ref. provider found    HISTORY OF PRESENT ILLNESS   Elvia Zepeda is a 80year old female who presents for diabetes mellitus.      Dr. Bear Olvera, [facility]    Prior HbA, C or glycohemogl Strip, TEST TWICE DAILY, Disp: 200 strip, Rfl: 2  •  docusate sodium 100 MG Oral Cap, Take 1 capsule (100 mg total) by mouth 2 (two) times daily. , Disp: 60 capsule, Rfl: 1  •  Acetaminophen (TYLENOL EXTRA STRENGTH OR), Take 1 tablet by mouth nightly as nee Inject 30 Units into the skin nightly.  , Disp: , Rfl:      Allergies:     Opioid Analgesics       NAUSEA AND VOMITING  Codeine                     Comment:Hallucinating, vomiting  Diazepam                    Comment:vomiting    Social History:   Social Hi nodes noted  Skin:  normal moisture and skin texture  Hematologic:  no excessive bruising  Psychiatric:  oriented to time, self, and place    ASSESSMENT/PLAN:      1.  Diabetes Mellitus Type 2, UnControlled  -uncontrolled; HgA1c 8.3% -->stable   -Her fastin

## 2021-09-03 RX ORDER — BLOOD SUGAR DIAGNOSTIC
STRIP MISCELLANEOUS
Qty: 200 STRIP | Refills: 3 | Status: SHIPPED | OUTPATIENT
Start: 2021-09-03

## 2021-09-09 RX ORDER — ATORVASTATIN CALCIUM 20 MG/1
20 TABLET, FILM COATED ORAL DAILY
Qty: 90 TABLET | Refills: 0 | Status: SHIPPED | OUTPATIENT
Start: 2021-09-09

## 2021-09-24 ENCOUNTER — HOSPITAL ENCOUNTER (EMERGENCY)
Facility: HOSPITAL | Age: 86
Discharge: HOME | End: 2021-09-24
Attending: EMERGENCY MEDICINE
Payer: MEDICARE

## 2021-09-24 ENCOUNTER — APPOINTMENT (OUTPATIENT)
Dept: GENERAL RADIOLOGY | Facility: HOSPITAL | Age: 86
End: 2021-09-24
Attending: EMERGENCY MEDICINE
Payer: MEDICARE

## 2021-09-24 VITALS
RESPIRATION RATE: 25 BRPM | HEIGHT: 62 IN | HEART RATE: 81 BPM | TEMPERATURE: 98 F | SYSTOLIC BLOOD PRESSURE: 127 MMHG | OXYGEN SATURATION: 97 % | BODY MASS INDEX: 37.73 KG/M2 | WEIGHT: 205 LBS | DIASTOLIC BLOOD PRESSURE: 57 MMHG

## 2021-09-24 DIAGNOSIS — R53.1 WEAKNESS GENERALIZED: Primary | ICD-10-CM

## 2021-09-24 LAB
ANION GAP SERPL CALC-SCNC: 7 MMOL/L (ref 0–18)
BASOPHILS # BLD AUTO: 0.04 X10(3) UL (ref 0–0.2)
BASOPHILS NFR BLD AUTO: 0.4 %
BILIRUB UR QL: NEGATIVE
BUN BLD-MCNC: 50 MG/DL (ref 7–18)
BUN/CREAT SERPL: 35 (ref 10–20)
CALCIUM BLD-MCNC: 9 MG/DL (ref 8.5–10.1)
CHLORIDE SERPL-SCNC: 103 MMOL/L (ref 98–112)
CO2 SERPL-SCNC: 30 MMOL/L (ref 21–32)
COLOR UR: YELLOW
CREAT BLD-MCNC: 1.43 MG/DL
DEPRECATED RDW RBC AUTO: 49.1 FL (ref 35.1–46.3)
EOSINOPHIL # BLD AUTO: 0.03 X10(3) UL (ref 0–0.7)
EOSINOPHIL NFR BLD AUTO: 0.3 %
ERYTHROCYTE [DISTWIDTH] IN BLOOD BY AUTOMATED COUNT: 14 % (ref 11–15)
GLUCOSE BLD-MCNC: 183 MG/DL (ref 70–99)
GLUCOSE UR-MCNC: NEGATIVE MG/DL
HCT VFR BLD AUTO: 42.6 %
HGB BLD-MCNC: 13.4 G/DL
HYALINE CASTS #/AREA URNS AUTO: PRESENT /LPF
IMM GRANULOCYTES # BLD AUTO: 0.06 X10(3) UL (ref 0–1)
IMM GRANULOCYTES NFR BLD: 0.6 %
KETONES UR-MCNC: NEGATIVE MG/DL
LEUKOCYTE ESTERASE UR QL STRIP.AUTO: NEGATIVE
LYMPHOCYTES # BLD AUTO: 1.58 X10(3) UL (ref 1–4)
LYMPHOCYTES NFR BLD AUTO: 15.4 %
MCH RBC QN AUTO: 30 PG (ref 26–34)
MCHC RBC AUTO-ENTMCNC: 31.5 G/DL (ref 31–37)
MCV RBC AUTO: 95.3 FL
MONOCYTES # BLD AUTO: 1.34 X10(3) UL (ref 0.1–1)
MONOCYTES NFR BLD AUTO: 13 %
NEUTROPHILS # BLD AUTO: 7.23 X10 (3) UL (ref 1.5–7.7)
NEUTROPHILS # BLD AUTO: 7.23 X10(3) UL (ref 1.5–7.7)
NEUTROPHILS NFR BLD AUTO: 70.3 %
NITRITE UR QL STRIP.AUTO: NEGATIVE
OSMOLALITY SERPL CALC.SUM OF ELEC: 308 MOSM/KG (ref 275–295)
PH UR: 6 [PH] (ref 5–8)
PLATELET # BLD AUTO: 142 10(3)UL (ref 150–450)
POTASSIUM SERPL-SCNC: 3.9 MMOL/L (ref 3.5–5.1)
PROT UR-MCNC: 100 MG/DL
RBC # BLD AUTO: 4.47 X10(6)UL
SARS-COV-2 RNA RESP QL NAA+PROBE: NOT DETECTED
SODIUM SERPL-SCNC: 140 MMOL/L (ref 136–145)
SP GR UR STRIP: 1.01 (ref 1–1.03)
UROBILINOGEN UR STRIP-ACNC: <2
WBC # BLD AUTO: 10.3 X10(3) UL (ref 4–11)

## 2021-09-24 PROCEDURE — 81001 URINALYSIS AUTO W/SCOPE: CPT | Performed by: EMERGENCY MEDICINE

## 2021-09-24 PROCEDURE — 85025 COMPLETE CBC W/AUTO DIFF WBC: CPT | Performed by: EMERGENCY MEDICINE

## 2021-09-24 PROCEDURE — 80048 BASIC METABOLIC PNL TOTAL CA: CPT | Performed by: EMERGENCY MEDICINE

## 2021-09-24 PROCEDURE — 71045 X-RAY EXAM CHEST 1 VIEW: CPT | Performed by: EMERGENCY MEDICINE

## 2021-09-24 PROCEDURE — 36415 COLL VENOUS BLD VENIPUNCTURE: CPT

## 2021-09-24 PROCEDURE — 99283 EMERGENCY DEPT VISIT LOW MDM: CPT

## 2021-09-24 NOTE — ED QUICK NOTES
Pt assisted to car by staff. Hospice finished with patient and family member. All questioned answered.

## 2021-09-24 NOTE — CM/SW NOTE
SW received referral , aidin being placed . Will meet with daughters to provide information    Provided information.  Hospice rn will be meeting at house tomorrow to provide hospice evaluation
[facility]. Ruel Ruvalcaba will come to the ED and speak with the daughters. The pt is a full code and lives at home. Family caring for her, but having great difficulty.
yes

## 2021-09-24 NOTE — ED PROVIDER NOTES
Patient Seen in: [facility] AND [facility] Emergency Department    History   Patient presents with:  Fatigue      HPI    Patient presents to the ED with her daughters for progressive weakness over the past 3 weeks or more.   Decreased energy, sleeping most of the negative. Constitutional and vital signs reviewed. Social History and Family History elements reviewed from today, pertinent positives to the presenting problem noted.     Physical Exam     ED Triage Vitals [09/24/21 1351]   /70   Pulse 87   R Creatinine 1.43 (*)     BUN/CREA Ratio 35.0 (*)     Calculated Osmolality 308 (*)     GFR, Non- 32 (*)     GFR, -American 37 (*)     All other components within normal limits   URINALYSIS WITH CULTURE REFLEX - Abnormal; Notable f Weight: 93 kg      Height: 157.5 cm (5' 2\")        *I personally reviewed and interpreted all ED vitals.     Pulse Ox: 95%, Room air, Normal     Monitor Interpretation:   normal sinus rhythm    Differential Diagnosis/ Diagnostic Considerations: UTI, anem

## 2021-09-29 ENCOUNTER — TELEPHONE (OUTPATIENT)
Dept: CARDIOLOGY | Age: 86
End: 2021-09-29

## 2022-06-21 ENCOUNTER — APPOINTMENT (OUTPATIENT)
Dept: CARDIOLOGY | Age: 87
End: 2022-06-21
Attending: INTERNAL MEDICINE

## 2022-08-23 NOTE — TELEPHONE ENCOUNTER
EMERGENCY DEPARTMENT ENCOUNTER    Room Number:  39/39  Date of encounter:  8/23/2022  PCP: Lucian Luna MD  Historian: Patient    Patient was placed in face mask during triage process. Patient was wearing facemask when I entered the room and throughout our encounter. I wore full protective equipment throughout this patient encounter including a face mask, eye protection, and gloves. Hand hygiene was performed before donning protective equipment and again following doffing of PPE after leaving the room.    HPI:  Chief Complaint: Fatigue and dizziness  A complete HPI/ROS/PMH/PSH/SH/FH are unobtainable due to: N/A   Context: Maureen Staples is a 66 y.o. female with history of COPD, hypertension, GERD, iron deficiency and status post gastric bypass who presents to the ED c/o generalized fatigue and some exertional dyspnea for the last 3 weeks since diagnosis of COVID 19 infection.  Patient reports her cough is improved and is no longer productive but she continues to have significant exertional fatigue and some dyspnea.  No new focal pains reported.  No hemoptysis, chest pain, nausea vomiting or diarrhea though she reports poor appetite.  No reported fevers.  No syncope though she sometimes feels lightheaded when standing especially after being seated for some time.      MEDICAL HISTORY REVIEW  EMR reviewed:    PAST MEDICAL HISTORY  Active Ambulatory Problems     Diagnosis Date Noted   • Examination 08/01/2016   • Iron deficiency anemia refractory to iron therapy 08/08/2016   • Status post gastric bypass for obesity 08/08/2016   • Status post total right knee replacement 08/08/2016   • Urinary tract infection, site not specified 10/28/2021   • Other dysphagia 01/24/2022     Resolved Ambulatory Problems     Diagnosis Date Noted   • Primary osteoarthritis of right hip 04/30/2018     Past Medical History:   Diagnosis Date   • Anemia    • Arthritis    • Asthma    • Bruising    • Choking    • COPD (chronic obstructive  Checked with insurance. tradjenta and Januvia are both preferred on insurance but due to copay will be expensive until met. pulmonary disease) (HCC)    • Cyst of pineal gland    • Frequent UTI    • GERD (gastroesophageal reflux disease)    • Hip pain    • History of skin cancer    • Hypertension    • Iron deficiency    • Scratch of hand    • Skin cancer    • Sleep apnea    • Thin skin    • Trouble swallowing    • Urinary frequency    • Urinary urgency          PAST SURGICAL HISTORY  Past Surgical History:   Procedure Laterality Date   • APPENDECTOMY     • BASAL CELL CARCINOMA EXCISION     • BREAST BIOPSY     • BUNIONECTOMY  2009    Jonah   •  SECTION  1981   • CHOLECYSTECTOMY     • COLONOSCOPY     • ENDOSCOPY N/A 2022    Procedure: ESOPHAGOGASTRODUODENOSCOPY WITH COLD BIOPSIES, PENA DILATION 56FR;  Surgeon: Zachary Winston MD;  Location: [facility] ENDOSCOPY;  Service: Gastroenterology;  Laterality: N/A;  PRE- DYSPHAGIA  POST-NORMAL  POST SURGICAL ANATOMY   • GASTRIC BYPASS     • HERNIA REPAIR      Abdominal   • HYSTERECTOMY     • INTERSTIM PLACEMENT  2017    Implantation of Interstim Neurostimulator for bladder.   • REFRACTIVE SURGERY Bilateral    • REPLACEMENT TOTAL KNEE Right    • TOE SURGERY     • TONSILLECTOMY     • TOTAL HIP ARTHROPLASTY Right 05/15/2018    Procedure: TOTAL HIP ARTHROPLASTY ANTERIOR WITH HANA TABLE;  Surgeon: Figueroa Szymanski MD;  Location: [facility] MAIN OR;  Service: Orthopedics         FAMILY HISTORY  Family History   Problem Relation Age of Onset   • Stroke Mother    • Hypertension Mother    • Cancer Father         Melanoma, stage III renal   • Hypertension Father    • Cancer Daughter 26        Hodglin's lymphoma Stage IV   • Diabetes Paternal Grandfather    • Malig Hyperthermia Neg Hx          SOCIAL HISTORY  Social History     Socioeconomic History   • Marital status:      Spouse name: Kraig   • Years of education: College   Tobacco Use   • Smoking status: Never Smoker   • Smokeless tobacco: Never Used   Vaping Use   • Vaping Use: Never used    Substance and Sexual Activity   • Alcohol use: Yes     Comment: 2 MARTINI PER NIGHT   • Drug use: No   • Sexual activity: Defer         ALLERGIES  Erythromycin        REVIEW OF SYSTEMS  Review of Systems     All systems reviewed and negative except for those discussed in HPI.       PHYSICAL EXAM    I have reviewed the triage vital signs and nursing notes.    ED Triage Vitals [08/23/22 1256]   Temp Heart Rate Resp BP SpO2   98.2 °F (36.8 °C) 92 16 -- 93 %      Temp src Heart Rate Source Patient Position BP Location FiO2 (%)   Tympanic Monitor -- -- --       Physical Exam    Physical Exam   Constitutional: No distress.  Not overtly toxic appearing  HENT:  Head: Normocephalic and atraumatic.   Oropharynx: Mucous membranes are moist.   Eyes: No scleral icterus. No conjunctival pallor.  Neck: Painless range of motion noted. Neck supple.   Cardiovascular: Normal rate, regular rhythm and intact distal pulses.  Pulmonary/Chest: No respiratory distress.  Slightly diminished especially at the bases.  Occasional dry hacking cough.  Abdominal: Soft. There is no tenderness. There is no rebound and no guarding.   Musculoskeletal: Moves all extremities equally. There is no pedal edema or calf tenderness.   Neurological: Alert.  Baseline strength and sensation noted.   Skin: Skin is pink, warm, and dry. No pallor.   Psychiatric: Mood and affect normal.   Nursing note and vitals reviewed.    LAB RESULTS  Recent Results (from the past 24 hour(s))   Comprehensive Metabolic Panel    Collection Time: 08/23/22  1:19 PM    Specimen: Blood   Result Value Ref Range    Glucose 109 (H) 65 - 99 mg/dL    BUN 14 8 - 23 mg/dL    Creatinine 0.67 0.57 - 1.00 mg/dL    Sodium 141 136 - 145 mmol/L    Potassium 4.0 3.5 - 5.2 mmol/L    Chloride 104 98 - 107 mmol/L    CO2 23.7 22.0 - 29.0 mmol/L    Calcium 8.9 8.6 - 10.5 mg/dL    Total Protein 6.4 6.0 - 8.5 g/dL    Albumin 3.50 3.50 - 5.20 g/dL    ALT (SGPT) 19 1 - 33 U/L    AST (SGOT) 21 1 - 32 U/L     Alkaline Phosphatase 133 (H) 39 - 117 U/L    Total Bilirubin 0.4 0.0 - 1.2 mg/dL    Globulin 2.9 gm/dL    A/G Ratio 1.2 g/dL    BUN/Creatinine Ratio 20.9 7.0 - 25.0    Anion Gap 13.3 5.0 - 15.0 mmol/L    eGFR 96.5 >60.0 mL/min/1.73   Lipase    Collection Time: 08/23/22  1:19 PM    Specimen: Blood   Result Value Ref Range    Lipase 8 (L) 13 - 60 U/L   Troponin    Collection Time: 08/23/22  1:19 PM    Specimen: Blood   Result Value Ref Range    Troponin T <0.010 0.000 - 0.030 ng/mL   Magnesium    Collection Time: 08/23/22  1:19 PM    Specimen: Blood   Result Value Ref Range    Magnesium 1.5 (L) 1.6 - 2.4 mg/dL   TSH    Collection Time: 08/23/22  1:19 PM    Specimen: Blood   Result Value Ref Range    TSH 1.850 0.270 - 4.200 uIU/mL   CBC Auto Differential    Collection Time: 08/23/22  1:19 PM    Specimen: Blood   Result Value Ref Range    WBC 8.10 3.40 - 10.80 10*3/mm3    RBC 4.49 3.77 - 5.28 10*6/mm3    Hemoglobin 13.2 12.0 - 15.9 g/dL    Hematocrit 39.9 34.0 - 46.6 %    MCV 88.9 79.0 - 97.0 fL    MCH 29.4 26.6 - 33.0 pg    MCHC 33.1 31.5 - 35.7 g/dL    RDW 13.9 12.3 - 15.4 %    RDW-SD 44.0 37.0 - 54.0 fl    MPV 10.5 6.0 - 12.0 fL    Platelets 158 140 - 450 10*3/mm3    Neutrophil % 77.9 (H) 42.7 - 76.0 %    Lymphocyte % 13.1 (L) 19.6 - 45.3 %    Monocyte % 7.3 5.0 - 12.0 %    Eosinophil % 1.0 0.3 - 6.2 %    Basophil % 0.1 0.0 - 1.5 %    Immature Grans % 0.6 (H) 0.0 - 0.5 %    Neutrophils, Absolute 6.31 1.70 - 7.00 10*3/mm3    Lymphocytes, Absolute 1.06 0.70 - 3.10 10*3/mm3    Monocytes, Absolute 0.59 0.10 - 0.90 10*3/mm3    Eosinophils, Absolute 0.08 0.00 - 0.40 10*3/mm3    Basophils, Absolute 0.01 0.00 - 0.20 10*3/mm3    Immature Grans, Absolute 0.05 0.00 - 0.05 10*3/mm3    nRBC 0.0 0.0 - 0.2 /100 WBC   D-dimer, Quantitative    Collection Time: 08/23/22  1:32 PM    Specimen: Blood   Result Value Ref Range    D-Dimer, Quantitative 0.47 0.00 - 0.49 MCGFEU/mL   ECG 12 Lead    Collection Time: 08/23/22  1:49 PM   Result  Value Ref Range    QT Interval 360 ms   Urinalysis With Microscopic If Indicated (No Culture) - Urine, Clean Catch    Collection Time: 08/23/22  2:20 PM    Specimen: Urine, Clean Catch   Result Value Ref Range    Color, UA Yellow Yellow, Straw    Appearance, UA Clear Clear    pH, UA 5.5 5.0 - 8.0    Specific Gravity, UA 1.018 1.005 - 1.030    Glucose, UA Negative Negative    Ketones, UA Negative Negative    Bilirubin, UA Negative Negative    Blood, UA Negative Negative    Protein, UA Negative Negative    Leuk Esterase, UA Small (1+) (A) Negative    Nitrite, UA Positive (A) Negative    Urobilinogen, UA 1.0 E.U./dL 0.2 - 1.0 E.U./dL   Urinalysis, Microscopic Only - Urine, Clean Catch    Collection Time: 08/23/22  2:20 PM    Specimen: Urine, Clean Catch   Result Value Ref Range    RBC, UA None Seen None Seen, 0-2 /HPF    WBC, UA 13-20 (A) None Seen, 0-2 /HPF    Bacteria, UA 3+ (A) None Seen /HPF    Squamous Epithelial Cells, UA 3-6 (A) None Seen, 0-2 /HPF    Hyaline Casts, UA None Seen None Seen /LPF    Methodology Manual Light Microscopy        Ordered the above labs and independently reviewed the results.        RADIOLOGY  XR Chest 1 View    Result Date: 8/23/2022  XR CHEST 1 VW-  HISTORY: Female who is 66 years-old, weakness  TECHNIQUE: Frontal view of the chest  COMPARISON: 5/8/2018  FINDINGS: The heart size is normal. Aorta is tortuous. Pulmonary vasculature is unremarkable. No focal pulmonary consolidation. Minimal likely atelectasis or scarring in the peripheral left mid to lower lung. Minimal right pleural effusion is apparent. No pneumothorax. Right hemidiaphragm remains elevated. No acute osseous process.      Minimal right pleural effusion. Minimal likely atelectasis or scarring the peripheral left mid to lower lung. Tortuous aorta.  This report was finalized on 8/23/2022 1:40 PM by Dr. Alfonso Kaur M.D.        I ordered the above noted radiological studies. Reviewed by me and discussed with  radiologist.  See dictation for official radiology interpretation.      PROCEDURES    Procedures        MEDICATIONS GIVEN IN ER    Medications   sodium chloride 0.9 % flush 10 mL (has no administration in time range)   sodium chloride 0.9 % bolus 500 mL (0 mL Intravenous Stopped 8/23/22 1500)   ipratropium-albuterol (DUO-NEB) nebulizer solution 3 mL (3 mL Nebulization Given 8/23/22 1340)   methylPREDNISolone sodium succinate (SOLU-Medrol) injection 125 mg (125 mg Intravenous Given 8/23/22 1333)         PROGRESS, DATA ANALYSIS, CONSULTS, AND MEDICAL DECISION MAKING    My differential diagnosis includes but is not limited to generalized weakness, electrolyte abnormality, CVA, TIA, Bell's palsy, acute MI, GI bleed, urinary tract infection, systemic infections including sepsis, alcohol abuse, drug abuse including prescription and street drug.    My differential diagnosis for dyspnea includes but is not limited to:  Asthma, COPD, pneumonia, pulmonary embolus, acute respiratory distress syndrome, pneumothorax, pleural effusion, pulmonary fibrosis, congestive heart failure, myocardial infarction, DKA, uremia, acidosis, sepsis, anemia, drug related, hyperventilation, CNS disease      All labs have been independently reviewed by me.  All radiology studies have been reviewed by me and discussed with radiologist dictating the report.   EKG's independently viewed and interpreted by me.  Discussion below represents my analysis of pertinent findings related to patient's condition, differential diagnosis, treatment plan and final disposition.      ED Course as of 08/23/22 1543   Tue Aug 23, 2022   1359 EKG           EKG time: 1349  Rhythm/Rate: Sinus, 85  P waves and WV: REGAN within normal limits  QRS, axis: Narrow complex with slow R wave progression  ST and T waves: No STEMI; QTC within normal limits    Interpreted Contemporaneously by me, independently viewed  Comparison: Unchanged as compared to 7/7/2022   [RS]   1453 D-Dimer,  Quant: 0.47 [RS]   1453 Leukocytes, UA(!): Small (1+) [RS]   1453 Nitrite, UA(!): Positive [RS]   1531 WBC, UA(!): 13-20 [RS]   1531 Bacteria, UA(!): 3+ [RS]   1543 Patient and spouse updated with findings and plan for discharge.  Ultimately agreeable with plan. [RS]      ED Course User Index  [RS] Gianluca Posadas MD       AS OF 15:43 EDT VITALS:    BP - (!) 143/106  HR - 90  TEMP - 98.2 °F (36.8 °C) (Tympanic)  O2 SATS - 92%        DIAGNOSIS  Final diagnoses:   Persistent fatigue after COVID-19   Acute UTI   Elevated blood pressure reading in office with diagnosis of hypertension         DISPOSITION  DISCHARGE    Patient discharged in stable condition.    Reviewed implications of results, diagnosis, meds, responsibility to follow up, warning signs and symptoms of possible worsening, potential complications and reasons to return to ER.    Patient/Family voiced understanding of above instructions.    Discussed plan for discharge, as there is no emergent indication for admission. Patient referred to primary care provider for regular health maintenance. Pt/family is agreeable and understands need for follow up and possible repeat testing.  Pt is aware that discharge does not mean that nothing is wrong but it indicates no emergency is present that requires admission and they must continue care with follow-up as given below or physician of their choice.     FOLLOW-UP  Lucian Luna MD  [address]  [place]  [facility] [address] [phone]    Schedule an appointment as soon as possible for a visit in 2 days  Follow-up with your primary care provider for reevaluation of elevated blood pressure and response to antibiotics for UTI         Medication List      New Prescriptions    cefuroxime 500 MG tablet  Commonly known as: CEFTIN  Take 1 tablet by mouth 2 (Two) Times a Day.           Where to Get Your Medications      These medications were sent to [facility] [address] [place] [address] [address] - [phone]  - [phone] FX  [address] [address]    Phone: [phone]   · cefuroxime 500 MG tablet            Gianluca Posadas MD  08/23/22 6671

## (undated) DEVICE — Device: Brand: DEFENDO AIR/WATER/SUCTION AND BIOPSY VALVE

## (undated) DEVICE — YANKAUER SUCTION INSTRUMENT NO CONTROL VENT, BULB TIP, CLEAR: Brand: YANKAUER

## (undated) DEVICE — LINE MNTR ADLT SET O2 INTMD

## (undated) DEVICE — ENDOSCOPY PACK UPPER: Brand: MEDLINE INDUSTRIES, INC.

## (undated) DEVICE — FORCEP RADIAL JAW 4

## (undated) DEVICE — 3 ML SYRINGE LUER-LOCK TIP: Brand: MONOJECT

## (undated) DEVICE — ENDOSCOPY PACK - LOWER: Brand: MEDLINE INDUSTRIES, INC.

## (undated) DEVICE — FIAPC® PROBE W/ FILTER 2200 SC OD 2.3MM/6.9FR; L 2.2M/7.2FT: Brand: ERBE

## (undated) DEVICE — 35 ML SYRINGE REGULAR TIP: Brand: MONOJECT

## (undated) DEVICE — CONMED SCOPE SAVER BITE BLOCK, 20X27 MM: Brand: SCOPE SAVER

## (undated) DEVICE — TRAP MCS 40ML 5IN PLS SCR CAP

## (undated) DEVICE — 6 ML SYRINGE LUER-LOCK TIP: Brand: MONOJECT

## (undated) DEVICE — REM POLYHESIVE ADULT PATIENT RETURN ELECTRODE: Brand: VALLEYLAB

## (undated) DEVICE — FIAPC® PROBE W/ FILTER 2200 A OD 2.3MM/6.9FR; L 2.2M/7.2FT: Brand: ERBE

## (undated) DEVICE — Device: Brand: CUSTOM PROCEDURE KIT

## (undated) NOTE — LETTER
[facility], [place]  Authorization for Invasive Procedures  1.  I hereby authorize Dr. Yanna Yousif , my physician and whomever may be designated as the doctor's assistant, to perform the following operation and/or procedure:  Colonscopy performed for the purposes of advancing medicine, science, and/or education, provided my identity is not revealed. If the procedure has been videotaped, the physician/surgeon will obtain the original videotape.  The hospital will not be responsible for stor My signature below affirms that prior to the time of the procedure, I have explained to the patient and/or her legal representative, the risks and benefits involved in the proposed treatment and any reasonable alternative to the proposed treatment.  I have

## (undated) NOTE — LETTER
8/13/2019          Olivia Zepeda  [address] [facility]    Dear Dinorah Zavala,       Here are the biopsy/pathology findings from your recent EGD (Upper  Endoscopy):stomach biopsies were negative for  H pylori bacteria.     If you need any furthe

## (undated) NOTE — LETTER
[facility]  Administration of Anesthesia  1. Tiffanie Hung Loss, or _________________________________ acting on her behalf, (Patient) (Dependent/Representative) request to receive anesthesia for my pending procedure/operation/treatment.   ELMER wills infections, high spinal block, spinal bleeding, seizure, cardiac arrest and death. 7. AWARENESS: I understand that it is possible (but unlikely) to have explicit memory of events from the operating room while under general anesthesia.   8. ELECTROCONVULSIV (Date) (Time)                                                                                               (Responsible person in case of minor/ unconscious pt) /Relationship    My signature below affirms that prior to the time of the procedure, I have ex

## (undated) NOTE — LETTER
[facility]  Administration of Anesthesia  1. Myrtle Suhnani Loss, or _________________________________ acting on her behalf, (Patient) (Dependent/Representative) request to receive anesthesia for my pending procedure/operation/treatment.   ELMER p infections, high spinal block, spinal bleeding, seizure, cardiac arrest and death. 7. AWARENESS: I understand that it is possible (but unlikely) to have explicit memory of events from the operating room while under general anesthesia.   8. ELECTROCONVULSIV unconscious pt /Relationship    My signature below affirms that prior to the time of the procedure, I have explained to the patient and/or his/her guardian, the risks and benefits of undergoing anesthesia, as well as any reasonable alternatives.     _______

## (undated) NOTE — LETTER
[facility]  Administration of Anesthesia  1. Abbey Barnesas Loss, or _________________________________ acting on her behalf, (Patient) (Dependent/Representative) request to receive anesthesia for my pending procedure/operation/treatment.   ELMER wills infections, high spinal block, spinal bleeding, seizure, cardiac arrest and death. 7. AWARENESS: I understand that it is possible (but unlikely) to have explicit memory of events from the operating room while under general anesthesia.   8. ELECTROCONVULSIV (Date) (Time)                                                                                               (Responsible person in case of minor/ unconscious pt) /Relationship    My signature below affirms that prior to the time of the procedure, I have ex

## (undated) NOTE — LETTER
[facility]  Administration of Anesthesia  1. Toma Lowjaswinder Loss, or _________________________________ acting on her behalf, (Patient) (Dependent/Representative) request to receive anesthesia for my pending procedure/operation/treatment.   ELMER wills infections, high spinal block, spinal bleeding, seizure, cardiac arrest and death. 7. AWARENESS: I understand that it is possible (but unlikely) to have explicit memory of events from the operating room while under general anesthesia.   8. ELECTROCONVULSIV unconscious pt /Relationship    My signature below affirms that prior to the time of the procedure, I have explained to the patient and/or his/her guardian, the risks and benefits of undergoing anesthesia, as well as any reasonable alternatives.     _______